# Patient Record
Sex: FEMALE | Race: WHITE | NOT HISPANIC OR LATINO | Employment: FULL TIME | URBAN - METROPOLITAN AREA
[De-identification: names, ages, dates, MRNs, and addresses within clinical notes are randomized per-mention and may not be internally consistent; named-entity substitution may affect disease eponyms.]

---

## 2017-01-12 ENCOUNTER — TRANSFERRED RECORDS (OUTPATIENT)
Dept: SURGERY | Facility: CLINIC | Age: 52
End: 2017-01-12

## 2017-02-02 ENCOUNTER — TRANSFERRED RECORDS (OUTPATIENT)
Dept: SURGERY | Facility: CLINIC | Age: 52
End: 2017-02-02

## 2017-02-03 ENCOUNTER — OFFICE VISIT (OUTPATIENT)
Dept: OTHER | Facility: CLINIC | Age: 52
End: 2017-02-03
Attending: INTERNAL MEDICINE
Payer: COMMERCIAL

## 2017-02-03 VITALS
HEART RATE: 102 BPM | WEIGHT: 127 LBS | SYSTOLIC BLOOD PRESSURE: 118 MMHG | RESPIRATION RATE: 12 BRPM | BODY MASS INDEX: 22.5 KG/M2 | DIASTOLIC BLOOD PRESSURE: 72 MMHG | OXYGEN SATURATION: 100 %

## 2017-02-03 DIAGNOSIS — E78.5 HYPERLIPIDEMIA LDL GOAL <100: ICD-10-CM

## 2017-02-03 DIAGNOSIS — I77.3 FIBROMUSCULAR DYSPLASIA (H): Primary | ICD-10-CM

## 2017-02-03 PROCEDURE — 99215 OFFICE O/P EST HI 40 MIN: CPT | Mod: ZP | Performed by: INTERNAL MEDICINE

## 2017-02-03 PROCEDURE — 99211 OFF/OP EST MAY X REQ PHY/QHP: CPT

## 2017-02-03 PROCEDURE — 40000809 ZZH STATISTIC NO DOCUMENTATION TO SUPPORT CHARGE

## 2017-02-03 NOTE — PROGRESS NOTES
See letter to PCP.   Greater than one half the 60 minutes total spent on the pt's visit were spent providing education and counselling to the patient regarding the above matters. Additional time secondary to young patient new to me with complex history.

## 2017-02-03 NOTE — PROGRESS NOTES
February 3, 2017            Phong Jarvis MD   [facility]   [address] [place]   [address]      Dear Juan:      I saw your patient, Razia Almeida in initial vascular medical at the request of the patient and her sister, who is a nurse at [employer] after she was previously seen by my partner, Dr. Rainer Onofre, to address management needs for her carotid and renal FMD with asymptomatic incidentally discovered carotid dissection on the right and beading without dissection on the left.  She has no intracranial aneurysms.  She has right renal artery fibromuscular dysplasia without mesenteric aneurysms.  She has no difficulty controlling her blood pressure.  She does utilize phentermine to help with concentration.  She also utilizes Estrace for hormone replacement therapy.  She has previously undergone chiropractic manipulations.  She does not engage in neck hyperextension maneuvers.  She has no joint laxity or loose skin (other than that caused by weight loss which was intentional).  She is currently undergoing adjuvant treatment with ipilimumab for stage IIIB malignant melanoma.  Her FMD findings were incidentally discovered as a result of imaging done in staging of her melanoma.  She has no clinical history of stroke or TIA.  She was previously recommended to start Plavix by my partner.  She is having significant bruising with this.        REVIEW OF SYSTEMS:  She is anxious regarding multiple doctor visits.  She denies chest pain, shortness of breath, nausea, vomiting, diarrhea, headache or visual changes.      PAST MEDICAL HISTORY:   1.  Anxiety.   2.  Stage IIIB melanoma.   3.  Chronic neck pain which is musculoskeletal in origin, not associated with carotid dissection pain.      PREVIOUS SURGICAL HISTORY:   1.  Knee arthroscopy.   2.  Hysterectomy.   3.  Lymphadenectomy.   4.  Breast lumpectomy.      FAMILY HISTORY:  Notable for paternal grandfather with heart  disease, 2 sisters with breast cancer, paternal grandmother with hypertension and a stroke.      SOCIAL HISTORY:  Notable for the fact that she is a former tobacco smoker.  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  She is .  She has 2 adult children.      CURRENT MEDICATIONS:     1.  ipilimumab per Oncology.   2.  Lipitor 10 mg daily.   3.  Norco p.r.n.   4.  Caltrate 600 mg daily.   5.  Clonazepam 0.5 mg t.i.d. p.r.n.   6.  Acyclovir 800 mg p.o. 5 times daily.   7.  Doxazosin 2 mg daily.   8.  Estrace 1 mg daily.   9.  Glucosamine 2 capsules daily.   10.  Nicoderm CQ.   11.  Phentermine 30 mg q.a.m. p.r.n.   12.  Imitrex 100 mg p.o. daily p.r.n.   13.  Ambien 5 mg p.o. each day at bedtime p.r.n.      ALLERGIES:  No known drug allergies.      PHYSICAL EXAMINATION:   VITAL SIGNS:  Blood pressure is 118/72, pulse is 102 and regular, respirations are 12, weight is 127 pounds.   HEENT:  Oropharynx within normal limits.   NECK:  No JVD, thyromegaly, lymphadenopathy, no carotid bruits.   LUNGS:  Clear to auscultation bilaterally without rales, wheezes or rhonchi.   HEART:  Regular rate and rhythm, normal S1, S2, no S3, S4, murmur, gallop or rub.   GASTROINTESTINAL:  Normoactive bowel sounds, soft, nondistended, nontender.   EXTREMITIES:  Without cyanosis, clubbing or edema.  No abdominal bruits.   NEUROLOGIC:  Nonfocal.   DERMATOLOGIC:  No obvious malignant nevi.      IMPRESSION:      Asymptomatic incidentally discovered fibromuscular dysplasia without poorly controlled hypertension involving the right renal artery, left internal carotid artery and right internal carotid artery with prior dissection asymptomatically occurring in the right internal carotid artery.      DISCUSSION:      Firstly, It was a pleasure to see patient.  Given her lack of clinical events, I do not feel strongly that she needs to remain on Plavix, particularly in light of her bruising.  She should, however, be on antiplatelet  therapy in the form of a baby aspirin.  She is advised to avoid neck extension maneuvers.  She is advised to avoid chiropractic manipulation.  I have advised her to stop Estrace and phentermine.  This is a difficult decision for her.  I informed her that it would be better for her from a vascular perspective if she was not utilizing hormone replacement therapy.  She understands the above.  She states that her hot flashes are so incapacitating that she will have to weigh the risks and benefits of continuation versus discontinuation in her mind.  She stated that she will stop phentermine.  She must not smoke moving forward.  She should be on a statin as a patient with fibromuscular dysplasia simply to prevent the progressive development of atherosclerotic disease.  I will obtain sonographic imaging of the patient's carotid and renal territories in November of this year.  I may not need to see her again in the absence of symptoms moving forward.  If she would have a vascular event then she would clearly be a candidate for Plavix and/or anticoagulation based upon the clinical scenario.  Alternatively, I informed her that if her blood pressure is poorly controlled on a good 3-drug regimen which includes a diuretic that we could consider renal artery angiography and angioplasty at that time.        Please do not hesitate to contact me if I may be of assistance regarding this or other matters moving forward into the future.       Sincerely,         ISABEL BILLY MD             D: 2017 09:43   T: 2017 11:03   MT: [id number]      Name:     DALE NORWOOD   MRN:      [id number]        Account:      [id number]   :      1965           Visit Date:   2017      Document: [id number]       cc: Phong Jarvis MD

## 2017-03-02 ENCOUNTER — HOSPITAL ENCOUNTER (EMERGENCY)
Facility: CLINIC | Age: 52
Discharge: HOME | End: 2017-03-02
Attending: EMERGENCY MEDICINE | Admitting: EMERGENCY MEDICINE
Payer: COMMERCIAL

## 2017-03-02 ENCOUNTER — APPOINTMENT (OUTPATIENT)
Dept: CT IMAGING | Facility: CLINIC | Age: 52
End: 2017-03-02
Attending: EMERGENCY MEDICINE
Payer: COMMERCIAL

## 2017-03-02 VITALS
RESPIRATION RATE: 18 BRPM | WEIGHT: 126 LBS | SYSTOLIC BLOOD PRESSURE: 126 MMHG | TEMPERATURE: 98.4 F | OXYGEN SATURATION: 98 % | HEART RATE: 125 BPM | DIASTOLIC BLOOD PRESSURE: 81 MMHG | BODY MASS INDEX: 21.51 KG/M2 | HEIGHT: 64 IN

## 2017-03-02 DIAGNOSIS — R10.84 ABDOMINAL PAIN, GENERALIZED: ICD-10-CM

## 2017-03-02 LAB
ALBUMIN UR-MCNC: NEGATIVE MG/DL
ANION GAP SERPL CALCULATED.3IONS-SCNC: 7 MMOL/L (ref 3–14)
APPEARANCE UR: ABNORMAL
BASE EXCESS BLDV CALC-SCNC: 4.7 MMOL/L
BASOPHILS # BLD AUTO: 0 10E9/L (ref 0–0.2)
BASOPHILS NFR BLD AUTO: 0.2 %
BILIRUB UR QL STRIP: NEGATIVE
BUN SERPL-MCNC: 14 MG/DL (ref 7–30)
CALCIUM SERPL-MCNC: 8.3 MG/DL (ref 8.5–10.1)
CHLORIDE SERPL-SCNC: 104 MMOL/L (ref 94–109)
CO2 SERPL-SCNC: 28 MMOL/L (ref 20–32)
COLOR UR AUTO: YELLOW
CREAT SERPL-MCNC: 0.77 MG/DL (ref 0.52–1.04)
DIFFERENTIAL METHOD BLD: ABNORMAL
EOSINOPHIL # BLD AUTO: 0.1 10E9/L (ref 0–0.7)
EOSINOPHIL NFR BLD AUTO: 0.3 %
ERYTHROCYTE [DISTWIDTH] IN BLOOD BY AUTOMATED COUNT: 12.6 % (ref 10–15)
GFR SERPL CREATININE-BSD FRML MDRD: 79 ML/MIN/1.7M2
GLUCOSE SERPL-MCNC: 137 MG/DL (ref 70–99)
GLUCOSE UR STRIP-MCNC: NEGATIVE MG/DL
HCO3 BLDV-SCNC: 29 MMOL/L (ref 21–28)
HCT VFR BLD AUTO: 42.5 % (ref 35–47)
HGB BLD-MCNC: 14 G/DL (ref 11.7–15.7)
HGB UR QL STRIP: NEGATIVE
HYALINE CASTS #/AREA URNS LPF: 1 /LPF (ref 0–2)
IMM GRANULOCYTES # BLD: 0.1 10E9/L (ref 0–0.4)
IMM GRANULOCYTES NFR BLD: 0.5 %
KETONES UR STRIP-MCNC: 5 MG/DL
LACTATE BLD-SCNC: 1 MMOL/L (ref 0.7–2.1)
LEUKOCYTE ESTERASE UR QL STRIP: NEGATIVE
LYMPHOCYTES # BLD AUTO: 1 10E9/L (ref 0.8–5.3)
LYMPHOCYTES NFR BLD AUTO: 6 %
MCH RBC QN AUTO: 28.3 PG (ref 26.5–33)
MCHC RBC AUTO-ENTMCNC: 32.9 G/DL (ref 31.5–36.5)
MCV RBC AUTO: 86 FL (ref 78–100)
MONOCYTES # BLD AUTO: 1.1 10E9/L (ref 0–1.3)
MONOCYTES NFR BLD AUTO: 6.3 %
MUCOUS THREADS #/AREA URNS LPF: PRESENT /LPF
NEUTROPHILS # BLD AUTO: 14.9 10E9/L (ref 1.6–8.3)
NEUTROPHILS NFR BLD AUTO: 86.7 %
NITRATE UR QL: NEGATIVE
NRBC # BLD AUTO: 0 10*3/UL
NRBC BLD AUTO-RTO: 0 /100
O2/TOTAL GAS SETTING VFR VENT: ABNORMAL %
OXYHGB MFR BLDV: 56 %
PCO2 BLDV: 44 MM HG (ref 40–50)
PH BLDV: 7.44 PH (ref 7.32–7.43)
PH UR STRIP: 6 PH (ref 5–7)
PLATELET # BLD AUTO: 290 10E9/L (ref 150–450)
PO2 BLDV: 30 MM HG (ref 25–47)
POTASSIUM SERPL-SCNC: 3.3 MMOL/L (ref 3.4–5.3)
RBC # BLD AUTO: 4.94 10E12/L (ref 3.8–5.2)
RBC #/AREA URNS AUTO: 1 /HPF (ref 0–2)
SODIUM SERPL-SCNC: 139 MMOL/L (ref 133–144)
SP GR UR STRIP: 1.02 (ref 1–1.03)
SQUAMOUS #/AREA URNS AUTO: <1 /HPF (ref 0–1)
URN SPEC COLLECT METH UR: ABNORMAL
UROBILINOGEN UR STRIP-MCNC: 0 MG/DL (ref 0–2)
WBC # BLD AUTO: 17.2 10E9/L (ref 4–11)
WBC #/AREA URNS AUTO: <1 /HPF (ref 0–2)

## 2017-03-02 PROCEDURE — 74177 CT ABD & PELVIS W/CONTRAST: CPT

## 2017-03-02 PROCEDURE — 96374 THER/PROPH/DIAG INJ IV PUSH: CPT | Mod: 59

## 2017-03-02 PROCEDURE — 96361 HYDRATE IV INFUSION ADD-ON: CPT

## 2017-03-02 PROCEDURE — 82805 BLOOD GASES W/O2 SATURATION: CPT | Performed by: EMERGENCY MEDICINE

## 2017-03-02 PROCEDURE — 25000128 H RX IP 250 OP 636: Performed by: EMERGENCY MEDICINE

## 2017-03-02 PROCEDURE — 83605 ASSAY OF LACTIC ACID: CPT | Performed by: EMERGENCY MEDICINE

## 2017-03-02 PROCEDURE — 81001 URINALYSIS AUTO W/SCOPE: CPT | Performed by: EMERGENCY MEDICINE

## 2017-03-02 PROCEDURE — 99285 EMERGENCY DEPT VISIT HI MDM: CPT | Mod: 25

## 2017-03-02 PROCEDURE — 80048 BASIC METABOLIC PNL TOTAL CA: CPT | Performed by: EMERGENCY MEDICINE

## 2017-03-02 PROCEDURE — 85025 COMPLETE CBC W/AUTO DIFF WBC: CPT | Performed by: EMERGENCY MEDICINE

## 2017-03-02 PROCEDURE — 25800025 ZZH RX 258: Performed by: EMERGENCY MEDICINE

## 2017-03-02 PROCEDURE — 25500064 ZZH RX 255 OP 636: Performed by: EMERGENCY MEDICINE

## 2017-03-02 RX ORDER — HYDROCODONE BITARTRATE AND ACETAMINOPHEN 5; 325 MG/1; MG/1
1-2 TABLET ORAL EVERY 6 HOURS PRN
Qty: 12 TABLET | Refills: 0 | Status: SHIPPED | OUTPATIENT
Start: 2017-03-02

## 2017-03-02 RX ORDER — IOPAMIDOL 755 MG/ML
500 INJECTION, SOLUTION INTRAVASCULAR ONCE
Status: COMPLETED | OUTPATIENT
Start: 2017-03-02 | End: 2017-03-02

## 2017-03-02 RX ADMIN — SODIUM CHLORIDE, POTASSIUM CHLORIDE, SODIUM LACTATE AND CALCIUM CHLORIDE 1000 ML: 600; 310; 30; 20 INJECTION, SOLUTION INTRAVENOUS at 20:24

## 2017-03-02 RX ADMIN — HYDROMORPHONE HYDROCHLORIDE 1 MG: 1 INJECTION, SOLUTION INTRAMUSCULAR; INTRAVENOUS; SUBCUTANEOUS at 20:24

## 2017-03-02 RX ADMIN — IOPAMIDOL 63 ML: 755 INJECTION, SOLUTION INTRAVENOUS at 20:34

## 2017-03-02 RX ADMIN — SODIUM CHLORIDE 56 ML: 9 INJECTION, SOLUTION INTRAVENOUS at 20:34

## 2017-03-02 ASSESSMENT — ENCOUNTER SYMPTOMS
BLOOD IN STOOL: 1
ABDOMINAL PAIN: 1

## 2017-03-02 NOTE — ED AVS SNAPSHOT
[facility] Emergency Department    [address]    [facility] [address]    Phone:  [phone]    Fax:  [phone]                                       Razia Almeida   MRN: [id number]    Department:  [facility] Emergency Department   Date of Visit:  3/2/2017           After Visit Summary Signature Page     I have received my discharge instructions, and my questions have been answered. I have discussed any challenges I see with this plan with the nurse or doctor.    ..........................................................................................................................................  Patient/Patient Representative Signature      ..........................................................................................................................................  Patient Representative Print Name and Relationship to Patient    ..................................................               ................................................  Date                                            Time    ..........................................................................................................................................  Reviewed by Signature/Title    ...................................................              ..............................................  Date                                                            Time

## 2017-03-02 NOTE — ED AVS SNAPSHOT
[facility] Emergency Department    [address]    [facility] [address]    Phone:  [phone]    Fax:  [phone]                                       Razia Almeida   MRN: [id number]    Department:  [facility] Emergency Department   Date of Visit:  3/2/2017           Patient Information     Date Of Birth          1965        Your diagnoses for this visit were:     Abdominal pain, generalized        You were seen by Adriano Hernandez MD.      Follow-up Information     Follow up with Phong Jarvis MD. Schedule an appointment as soon as possible for a visit in 5 days.    Specialty:  Family Practice    Why:  As needed    Contact information:    [facility]  [address] [facility] [address] [phone]          Discharge Instructions       Discharge Instructions  Abdominal Pain    Abdominal pain can be caused by many things. Your evaluation today does not show the exact cause for your pain. Your doctor today has decided that it is unlikely your pain is due to a life threatening problem, or a problem requiring surgery or hospital admission. Sometimes those problems cannot be found right away, so it is very important that you follow up as directed.  Sometimes only the changes which occur over time allow the cause of your pain to be found.    Return to the Emergency Department for a recheck in 8-12 hours if your pain continues.  If your pain gets worse, changes in location, or feels different, return to the Emergency Department right away.    ADULTS:  Return to the Emergency Department right away if:      You get an oral temperature above 102oF or as directed by your doctor.    You have blood in your stools (bright red or black, tarry stools).    You keep throwing up or can t drink liquids.    You see blood when you throw up.    You can t have a bowel movement or you can t pass gas.    Your stomach gets bloated or bigger.    Your skin  or the whites of your eyes look yellow.    You faint.    You have bloody, frequent or painful urination.    You have new symptoms or anything that worries you.    CHILDREN:  Return to the Emergency Department right away if your child has any of the above-listed symptoms or the following:      Pushes your hand away or screams/cries when his/her belly is touched.    You notice your child is very fussy or weak.    Your child is very tired and is too tired to eat or drink.    Your child is dehydrated.  Signs of dehydration can be:  o Your infant has had no wet diapers in 4-5 hours.  o Your older child has not passed urine in 6-8 hours.  o Your infant or child starts to have dry mouth and lips, or no saliva or tears.    PREGNANT WOMEN:  Return to the Emergency Department right away if you have any of the above-listed symptoms or the following:      You have bleeding, leaking fluid or passing tissue from the vagina.    You have worse pain or cramping, or pain in your shoulder or back.    You have vomiting that will not stop.    You have painful or bloody urination.    You have a temperature of 100oF or more.    Your baby is not moving as much as usual.    You faint.    You get a bad headache with or without eye problems and abdominal pain.    You have a convulsion or seizure.    You have unusual discharge from your vagina and abdominal pain.    Abdominal pain is pretty common during pregnancy.  Your pain may or may not be related to your pregnancy. You should follow-up closely with your OB doctor so they can evaluate you and your baby.  Until you follow-up with your regular doctor, do the following:       Avoid sex and do not put anything in your vagina.    Drink clear fluids.    Only take medications approved by your doctor.    MORE INFORMATION:    Appendicitis:  A possible cause of abdominal pain in any person who still has their appendix is acute appendicitis. Appendicitis is often hard to diagnose.  Testing does not  "always rule out early appendicitis or other causes of abdominal pain. Close follow-up with your doctor and re-evaluations may be needed to figure out the reason for your abdominal pain.    Follow-up:  It is very important that you make an appointment with your clinic and go to the appointment.  If you do not follow-up with your primary doctor, it may result in missing an important development which could result in permanent injury or disability and/or lasting pain.  If there is any problem keeping your appointment, call your doctor or return to the Emergency Department.    Medications:  Take your medications as directed by your doctor today.  Before using over-the-counter medications, ask your doctor and make sure to take the medications as directed.  If you have any questions about medications, ask your doctor.    Diet:  Resume your normal diet as much as possible, but do not eat fried, fatty or spicy foods while you have pain.  Do not drink alcohol or have caffeine.  Do not smoke tobacco.    Probiotics: If you have been given an antibiotic, you may want to also take a probiotic pill or eat yogurt with live cultures. Probiotics have \"good bacteria\" to help your intestines stay healthy. Studies have shown that probiotics help prevent diarrhea and other intestine problems (including C. diff infection) when you take antibiotics. You can buy these without a prescription in the pharmacy section of the store.     If you were given a prescription for medicine here today, be sure to read all of the information (including the package insert) that comes with your prescription.  This will include important information about the medicine, its side effects, and any warnings that you need to know about.  The pharmacist who fills the prescription can provide more information and answer questions you may have about the medicine.  If you have questions or concerns that the pharmacist cannot address, please call or return to the " Emergency Department.         Opioid Medication Information    Pain medications are among the most commonly prescribed medicines, so we are including this information for all our patients. If you did not receive pain medication or get a prescription for pain medicine, you can ignore it.     You may have been given a prescription for an opioid (narcotic) pain medicine and/or have received a pain medicine while here in the Emergency Department. These medicines can make you drowsy or impaired. You must not drive, operate dangerous equipment, or engage in any other dangerous activities while taking these medications. If you drive while taking these medications, you could be arrested for DUI, or driving under the influence. Do not drink any alcohol while you are taking these medications.     Opioid pain medications can cause addiction. If you have a history of chemical dependency of any type, you are at a higher risk of becoming addicted to pain medications.  Only take these prescribed medications to treat your pain when all other options have been tried. Take it for as short a time and as few doses as possible. Store your pain pills in a secure place, as they are frequently stolen and provide a dangerous opportunity for children or visitors in your house to start abusing these powerful medications. We will not replace any lost or stolen medicine.  As soon as your pain is better, you should flush all your remaining medication.     Many prescription pain medications contain Tylenol  (acetaminophen), including Vicodin , Tylenol #3 , Norco , Lortab , and Percocet .  You should not take any extra pills of Tylenol  if you are using these prescription medications or you can get very sick.  Do not ever take more than 3000 mg of acetaminophen in any 24 hour period.    All opioids tend to cause constipation. Drink plenty of water and eat foods that have a lot of fiber, such as fruits, vegetables, prune juice, apple juice and high  fiber cereal.  Take a laxative if you don t move your bowels at least every other day. Miralax , Milk of Magnesia, Colace , or Senna  can be used to keep you regular.      Remember that you can always come back to the Emergency Department if you are not able to see your regular doctor in the amount of time listed above, if you get any new symptoms, or if there is anything that worries you.          24 Hour Appointment Hotline       To make an appointment at any [facility], call [phone] ([phone]). If you don't have a family doctor or clinic, we will help you find one. [place] clinics are conveniently located to serve the needs of you and your family.             Review of your medicines      CONTINUE these medicines which may have CHANGED, or have new prescriptions. If we are uncertain of the size of tablets/capsules you have at home, strength may be listed as something that might have changed.        Dose / Directions Last dose taken    * HYDROcodone-acetaminophen 5-325 MG per tablet   Commonly known as:  NORCO   Dose:  1-2 tablet   What changed:  Another medication with the same name was added. Make sure you understand how and when to take each.   Quantity:  30 tablet        Take 1-2 tablets by mouth every 4 hours as needed for other (Moderate to Severe Pain)   Refills:  0        * HYDROcodone-acetaminophen 5-325 MG per tablet   Commonly known as:  NORCO   Dose:  1-2 tablet   What changed:  You were already taking a medication with the same name, and this prescription was added. Make sure you understand how and when to take each.   Quantity:  12 tablet        Take 1-2 tablets by mouth every 6 hours as needed for moderate to severe pain   Refills:  0        * Notice:  This list has 2 medication(s) that are the same as other medications prescribed for you. Read the directions carefully, and ask your doctor or other care provider to review them with you.      Our records show that you are taking  the medicines listed below. If these are incorrect, please call your family doctor or clinic.        Dose / Directions Last dose taken    ACYCLOVIR PO   Dose:  800 mg        Take 800 mg by mouth 5 times daily   Refills:  0        AMBIEN PO   Dose:  5 mg        Take 5 mg by mouth nightly as needed   Refills:  0        aspirin 81 MG EC tablet   Dose:  81 mg   Quantity:  90 tablet        Take 1 tablet (81 mg) by mouth daily   Refills:  3        ATIVAN PO   Dose:  0.5 mg        Take 0.5 mg by mouth once   Refills:  0        atorvastatin 10 MG tablet   Commonly known as:  LIPITOR   Dose:  10 mg   Quantity:  90 tablet        Take 1 tablet (10 mg) by mouth daily   Refills:  3        calcium-vitamin D 600-400 MG-UNIT per tablet   Commonly known as:  CALTRATE   Dose:  1 tablet        Take 1 tablet by mouth daily   Refills:  0        DOXAZOSIN MESYLATE PO   Dose:  2 mg        Take 2 mg by mouth daily   Refills:  0        ESTRACE PO   Dose:  1 mg        Take 1 mg by mouth daily   Refills:  0        glucosamine-chondroitin 500-400 MG Caps per capsule   Dose:  2 capsule        Take 2 capsules by mouth daily   Refills:  0        hydrocortisone 2.5 % cream   Commonly known as:  ANUSOL-HC        Place rectally 2 times daily   Refills:  0        IMITREX PO   Dose:  100 mg        Take 100 mg by mouth once   Refills:  0        nicotine 21 MG/24HR 24 hr patch   Commonly known as:  NICODERM CQ   Dose:  1 patch        Place 1 patch onto the skin every 24 hours   Refills:  0        YERVOY IV        Refills:  0                Prescriptions were sent or printed at these locations (1 Prescription)                   Other Prescriptions                Printed at Department/Unit printer (1 of 1)         HYDROcodone-acetaminophen (NORCO) 5-325 MG per tablet                Procedures and tests performed during your visit     Basic metabolic panel (BMP)    Blood gas venous and oxyhgb    CBC + differential    CT Abdomen Pelvis w Contrast    Lactic  acid whole blood    Peripheral IV catheter    UA with Microscopic reflex to Culture      Orders Needing Specimen Collection     None      Pending Results     No orders found from 2/28/2017 to 3/3/2017.            Pending Culture Results     No orders found from 2/28/2017 to 3/3/2017.             Test Results from your hospital stay     3/2/2017  8:28 PM - Interface, Flexilab Results      Component Results     Component Value Ref Range & Units Status    WBC 17.2 (H) 4.0 - 11.0 10e9/L Final    RBC Count 4.94 3.8 - 5.2 10e12/L Final    Hemoglobin 14.0 11.7 - 15.7 g/dL Final    Hematocrit 42.5 35.0 - 47.0 % Final    MCV 86 78 - 100 fl Final    MCH 28.3 26.5 - 33.0 pg Final    MCHC 32.9 31.5 - 36.5 g/dL Final    RDW 12.6 10.0 - 15.0 % Final    Platelet Count 290 150 - 450 10e9/L Final    Diff Method Automated Method  Final    % Neutrophils 86.7 % Final    % Lymphocytes 6.0 % Final    % Monocytes 6.3 % Final    % Eosinophils 0.3 % Final    % Basophils 0.2 % Final    % Immature Granulocytes 0.5 % Final    Nucleated RBCs 0 0 /100 Final    Absolute Neutrophil 14.9 (H) 1.6 - 8.3 10e9/L Final    Absolute Lymphocytes 1.0 0.8 - 5.3 10e9/L Final    Absolute Monocytes 1.1 0.0 - 1.3 10e9/L Final    Absolute Eosinophils 0.1 0.0 - 0.7 10e9/L Final    Absolute Basophils 0.0 0.0 - 0.2 10e9/L Final    Abs Immature Granulocytes 0.1 0 - 0.4 10e9/L Final    Absolute Nucleated RBC 0.0  Final         3/2/2017  8:42 PM - Interface, Flexilab Results      Component Results     Component Value Ref Range & Units Status    Sodium 139 133 - 144 mmol/L Final    Potassium 3.3 (L) 3.4 - 5.3 mmol/L Final    Chloride 104 94 - 109 mmol/L Final    Carbon Dioxide 28 20 - 32 mmol/L Final    Anion Gap 7 3 - 14 mmol/L Final    Glucose 137 (H) 70 - 99 mg/dL Final    Urea Nitrogen 14 7 - 30 mg/dL Final    Creatinine 0.77 0.52 - 1.04 mg/dL Final    GFR Estimate 79 >60 mL/min/1.7m2 Final    Non  GFR Calc    GFR Estimate If Black >90    American GFR Calc   >60 mL/min/1.7m2 Final    Calcium 8.3 (L) 8.5 - 10.1 mg/dL Final         3/2/2017  8:28 PM - Interface, Flexilab Results      Component Results     Component Value Ref Range & Units Status    Ph Venous 7.44 (H) 7.32 - 7.43 pH Final    PCO2 Venous 44 40 - 50 mm Hg Final    PO2 Venous 30 25 - 47 mm Hg Final    Bicarbonate Venous 29 (H) 21 - 28 mmol/L Final    FIO2 Room Air  Final    Oxyhemoglobin Venous 56 % Final    Base Excess Venous 4.7 mmol/L Final    Abnormal Result, Ref range: -7.7 to 1.9         3/2/2017  8:28 PM - Interface, Flexilab Results      Component Results     Component Value Ref Range & Units Status    Lactic Acid 1.0 0.7 - 2.1 mmol/L Final         3/2/2017  9:22 PM - Interface, Radiant Ib      Narrative     CT ABDOMEN AND PELVIS WITH CONTRAST   3/2/2017 8:42 PM     HISTORY: Severe diffuse abdominal pain. Status post colonoscopy.  Evaluate for perforation. History of melanoma.    TECHNIQUE: 63mL Isovue-370 IV were administered. After contrast  administration, volumetric helical sections were acquired from the  lung bases to the ischial tuberosities. Coronal images were also  reconstructed. Radiation dose for this scan was reduced using  automated exposure control, adjustment of the mA and/or kV according  to patient size, or iterative reconstruction technique.    COMPARISON: CT of the chest, abdomen, and pelvis performed 12/28/2016.      FINDINGS: No free intraperitoneal air is identified. No  intra-abdominal fluid collections to suggest abscess. There are a few  mildly dilated fluid-filled loops of small bowel in the pelvis, with  no definite transition point identified. No convincing evidence for  colitis or diverticulitis. No free fluid in the pelvis. The appendix  is not visualized. A tiny low-density lesion in the posterior segment  of the right hepatic lobe posteriorly measures 0.4 cm and is too small  to characterize, but appears unchanged. The liver, gallbladder,  spleen,  adrenal glands, pancreas, and kidneys are otherwise  unremarkable. No hydronephrosis. No enlarged lymph nodes are  identified in the abdomen or pelvis.        Impression     IMPRESSION:   1. Few mildly dilated fluid-filled loops of small bowel in the lower  abdomen, with no definite transition point identified. Findings  suggest ileus. A developing small bowel obstruction is considered less  likely, however, clinical correlation and follow-up are recommended.  2. No evidence for bowel perforation.    KARUNA WINTERS MD         3/2/2017  8:47 PM - Interface, Flexilab Results      Component Results     Component Value Ref Range & Units Status    Color Urine Yellow  Final    Appearance Urine Slightly Cloudy  Final    Glucose Urine Negative NEG mg/dL Final    Bilirubin Urine Negative NEG Final    Ketones Urine 5 (A) NEG mg/dL Final    Specific Gravity Urine 1.018 1.003 - 1.035 Final    Blood Urine Negative NEG Final    pH Urine 6.0 5.0 - 7.0 pH Final    Protein Albumin Urine Negative NEG mg/dL Final    Urobilinogen mg/dL 0.0 0.0 - 2.0 mg/dL Final    Nitrite Urine Negative NEG Final    Leukocyte Esterase Urine Negative NEG Final    Source Midstream Urine  Final    WBC Urine <1 0 - 2 /HPF Final    RBC Urine 1 0 - 2 /HPF Final    Squamous Epithelial /HPF Urine <1 0 - 1 /HPF Final    Mucous Urine Present (A) NEG /LPF Final    Hyaline Casts 1 0 - 2 /LPF Final                Clinical Quality Measure: Blood Pressure Screening     Your blood pressure was checked while you were in the emergency department today. The last reading we obtained was  BP: 126/81 . Please read the guidelines below about what these numbers mean and what you should do about them.  If your systolic blood pressure (the top number) is less than 120 and your diastolic blood pressure (the bottom number) is less than 80, then your blood pressure is normal. There is nothing more that you need to do about it.  If your systolic blood pressure (the top number) is  "120-139 or your diastolic blood pressure (the bottom number) is 80-89, your blood pressure may be higher than it should be. You should have your blood pressure rechecked within a year by a primary care provider.  If your systolic blood pressure (the top number) is 140 or greater or your diastolic blood pressure (the bottom number) is 90 or greater, you may have high blood pressure. High blood pressure is treatable, but if left untreated over time it can put you at risk for heart attack, stroke, or kidney failure. You should have your blood pressure rechecked by a primary care provider within the next 4 weeks.  If your provider in the emergency department today gave you specific instructions to follow-up with your doctor or provider even sooner than that, you should follow that instruction and not wait for up to 4 weeks for your follow-up visit.        Thank you for choosing [facility]       Thank you for choosing [facility] for your care. Our goal is always to provide you with excellent care. Hearing back from our patients is one way we can continue to improve our services. Please take a few minutes to complete the written survey that you may receive in the mail after you visit with us. Thank you!        [employer] Information     [employer] lets you send messages to your doctor, view your test results, renew your prescriptions, schedule appointments and more. To sign up, go to [email or web address] . Click on \"Log in\" on the left side of the screen, which will take you to the Welcome page. Then click on \"Sign up Now\" on the right side of the page.     You will be asked to enter the access code listed below, as well as some personal information. Please follow the directions to create your username and password.     Your access code is: [email or web address]  Expires: 2017  9:28 PM     Your access code will  in 90 days. If you need help or a new code, please call your [facility] clinic or [phone].        Care EveryWhere " ID     This is your Care EveryWhere ID. This could be used by other organizations to access your [facility] medical records  [id number]        After Visit Summary       This is your record. Keep this with you and show to your community pharmacist(s) and doctor(s) at your next visit.

## 2017-03-03 ENCOUNTER — HOSPITAL ENCOUNTER (OUTPATIENT)
Facility: CLINIC | Age: 52
Setting detail: OBSERVATION
Discharge: HOME | End: 2017-03-04
Attending: EMERGENCY MEDICINE | Admitting: HOSPITALIST
Payer: COMMERCIAL

## 2017-03-03 DIAGNOSIS — K92.2 LOWER GI BLEED: ICD-10-CM

## 2017-03-03 LAB
ABO + RH BLD: NORMAL
ABO + RH BLD: NORMAL
ANION GAP SERPL CALCULATED.3IONS-SCNC: 6 MMOL/L (ref 3–14)
APTT PPP: 39 SEC (ref 22–37)
BASOPHILS # BLD AUTO: 0 10E9/L (ref 0–0.2)
BASOPHILS NFR BLD AUTO: 0.2 %
BLD GP AB SCN SERPL QL: NORMAL
BLOOD BANK CMNT PATIENT-IMP: NORMAL
BUN SERPL-MCNC: 15 MG/DL (ref 7–30)
CALCIUM SERPL-MCNC: 8 MG/DL (ref 8.5–10.1)
CHLORIDE SERPL-SCNC: 102 MMOL/L (ref 94–109)
CO2 SERPL-SCNC: 30 MMOL/L (ref 20–32)
CREAT SERPL-MCNC: 0.85 MG/DL (ref 0.52–1.04)
DIFFERENTIAL METHOD BLD: ABNORMAL
EOSINOPHIL # BLD AUTO: 0.3 10E9/L (ref 0–0.7)
EOSINOPHIL NFR BLD AUTO: 2.4 %
ERYTHROCYTE [DISTWIDTH] IN BLOOD BY AUTOMATED COUNT: 12.7 % (ref 10–15)
GFR SERPL CREATININE-BSD FRML MDRD: 71 ML/MIN/1.7M2
GLUCOSE SERPL-MCNC: 68 MG/DL (ref 70–99)
HCT VFR BLD AUTO: 31.7 % (ref 35–47)
HGB BLD-MCNC: 10.2 G/DL (ref 11.7–15.7)
HGB BLD-MCNC: 9.1 G/DL (ref 11.7–15.7)
IMM GRANULOCYTES # BLD: 0 10E9/L (ref 0–0.4)
IMM GRANULOCYTES NFR BLD: 0.3 %
INR PPP: 1.15 (ref 0.86–1.14)
LYMPHOCYTES # BLD AUTO: 2.6 10E9/L (ref 0.8–5.3)
LYMPHOCYTES NFR BLD AUTO: 23 %
MCH RBC QN AUTO: 28.3 PG (ref 26.5–33)
MCHC RBC AUTO-ENTMCNC: 32.2 G/DL (ref 31.5–36.5)
MCV RBC AUTO: 88 FL (ref 78–100)
MONOCYTES # BLD AUTO: 0.6 10E9/L (ref 0–1.3)
MONOCYTES NFR BLD AUTO: 4.9 %
NEUTROPHILS # BLD AUTO: 7.8 10E9/L (ref 1.6–8.3)
NEUTROPHILS NFR BLD AUTO: 69.2 %
NRBC # BLD AUTO: 0 10*3/UL
NRBC BLD AUTO-RTO: 0 /100
PLATELET # BLD AUTO: 286 10E9/L (ref 150–450)
POTASSIUM SERPL-SCNC: 3.7 MMOL/L (ref 3.4–5.3)
RBC # BLD AUTO: 3.6 10E12/L (ref 3.8–5.2)
SODIUM SERPL-SCNC: 138 MMOL/L (ref 133–144)
SPECIMEN EXP DATE BLD: NORMAL
WBC # BLD AUTO: 11.3 10E9/L (ref 4–11)

## 2017-03-03 PROCEDURE — 85025 COMPLETE CBC W/AUTO DIFF WBC: CPT | Performed by: EMERGENCY MEDICINE

## 2017-03-03 PROCEDURE — G0378 HOSPITAL OBSERVATION PER HR: HCPCS

## 2017-03-03 PROCEDURE — 99220 ZZC INITIAL OBSERVATION CARE,LEVL III: CPT | Performed by: PHYSICIAN ASSISTANT

## 2017-03-03 PROCEDURE — 25000128 H RX IP 250 OP 636: Performed by: EMERGENCY MEDICINE

## 2017-03-03 PROCEDURE — 86850 RBC ANTIBODY SCREEN: CPT | Performed by: EMERGENCY MEDICINE

## 2017-03-03 PROCEDURE — 86901 BLOOD TYPING SEROLOGIC RH(D): CPT | Performed by: EMERGENCY MEDICINE

## 2017-03-03 PROCEDURE — 96361 HYDRATE IV INFUSION ADD-ON: CPT

## 2017-03-03 PROCEDURE — 36415 COLL VENOUS BLD VENIPUNCTURE: CPT | Performed by: EMERGENCY MEDICINE

## 2017-03-03 PROCEDURE — 25000128 H RX IP 250 OP 636: Performed by: PHYSICIAN ASSISTANT

## 2017-03-03 PROCEDURE — 36415 COLL VENOUS BLD VENIPUNCTURE: CPT | Performed by: PHYSICIAN ASSISTANT

## 2017-03-03 PROCEDURE — 80048 BASIC METABOLIC PNL TOTAL CA: CPT | Performed by: EMERGENCY MEDICINE

## 2017-03-03 PROCEDURE — 96360 HYDRATION IV INFUSION INIT: CPT

## 2017-03-03 PROCEDURE — 99285 EMERGENCY DEPT VISIT HI MDM: CPT | Mod: 25

## 2017-03-03 PROCEDURE — 85018 HEMOGLOBIN: CPT | Mod: 91 | Performed by: PHYSICIAN ASSISTANT

## 2017-03-03 PROCEDURE — 25000125 ZZHC RX 250: Performed by: PHYSICIAN ASSISTANT

## 2017-03-03 PROCEDURE — 86900 BLOOD TYPING SEROLOGIC ABO: CPT | Performed by: EMERGENCY MEDICINE

## 2017-03-03 PROCEDURE — 96374 THER/PROPH/DIAG INJ IV PUSH: CPT

## 2017-03-03 PROCEDURE — 85730 THROMBOPLASTIN TIME PARTIAL: CPT | Performed by: EMERGENCY MEDICINE

## 2017-03-03 PROCEDURE — 85610 PROTHROMBIN TIME: CPT | Performed by: EMERGENCY MEDICINE

## 2017-03-03 RX ORDER — NALOXONE HYDROCHLORIDE 0.4 MG/ML
.1-.4 INJECTION, SOLUTION INTRAMUSCULAR; INTRAVENOUS; SUBCUTANEOUS
Status: DISCONTINUED | OUTPATIENT
Start: 2017-03-03 | End: 2017-03-04 | Stop reason: HOSPADM

## 2017-03-03 RX ORDER — SODIUM CHLORIDE 9 MG/ML
INJECTION, SOLUTION INTRAVENOUS CONTINUOUS
Status: DISCONTINUED | OUTPATIENT
Start: 2017-03-03 | End: 2017-03-04

## 2017-03-03 RX ORDER — ATORVASTATIN CALCIUM 10 MG/1
10 TABLET, FILM COATED ORAL EVERY EVENING
Status: DISCONTINUED | OUTPATIENT
Start: 2017-03-03 | End: 2017-03-04 | Stop reason: HOSPADM

## 2017-03-03 RX ORDER — ACETAMINOPHEN 325 MG/1
650 TABLET ORAL EVERY 4 HOURS PRN
Status: DISCONTINUED | OUTPATIENT
Start: 2017-03-03 | End: 2017-03-04 | Stop reason: HOSPADM

## 2017-03-03 RX ORDER — PHENTERMINE HYDROCHLORIDE 30 MG/1
30 CAPSULE ORAL DAILY PRN
COMMUNITY
End: 2017-07-18

## 2017-03-03 RX ORDER — ONDANSETRON 4 MG/1
4 TABLET, ORALLY DISINTEGRATING ORAL EVERY 6 HOURS PRN
Status: DISCONTINUED | OUTPATIENT
Start: 2017-03-03 | End: 2017-03-04 | Stop reason: HOSPADM

## 2017-03-03 RX ORDER — DOXAZOSIN 4 MG/1
4 TABLET ORAL EVERY MORNING
Status: DISCONTINUED | OUTPATIENT
Start: 2017-03-04 | End: 2017-03-04 | Stop reason: HOSPADM

## 2017-03-03 RX ORDER — ESTRADIOL 0.5 MG/1
1 TABLET ORAL EVERY EVENING
Status: DISCONTINUED | OUTPATIENT
Start: 2017-03-03 | End: 2017-03-04 | Stop reason: HOSPADM

## 2017-03-03 RX ORDER — ONDANSETRON 2 MG/ML
4 INJECTION INTRAMUSCULAR; INTRAVENOUS EVERY 6 HOURS PRN
Status: DISCONTINUED | OUTPATIENT
Start: 2017-03-03 | End: 2017-03-04 | Stop reason: HOSPADM

## 2017-03-03 RX ORDER — ZOLPIDEM TARTRATE 5 MG/1
5 TABLET ORAL AT BEDTIME
Status: DISCONTINUED | OUTPATIENT
Start: 2017-03-03 | End: 2017-03-04 | Stop reason: HOSPADM

## 2017-03-03 RX ORDER — LIDOCAINE 40 MG/G
CREAM TOPICAL
Status: DISCONTINUED | OUTPATIENT
Start: 2017-03-03 | End: 2017-03-04 | Stop reason: HOSPADM

## 2017-03-03 RX ORDER — OXYCODONE HYDROCHLORIDE 5 MG/1
5 TABLET ORAL
Status: DISCONTINUED | OUTPATIENT
Start: 2017-03-03 | End: 2017-03-04 | Stop reason: HOSPADM

## 2017-03-03 RX ORDER — HYDROCORTISONE ACETATE 0.5 %
3000 CREAM (GRAM) TOPICAL EVERY EVENING
COMMUNITY

## 2017-03-03 RX ADMIN — PANTOPRAZOLE SODIUM 40 MG: 40 INJECTION, POWDER, FOR SOLUTION INTRAVENOUS at 18:44

## 2017-03-03 RX ADMIN — SODIUM CHLORIDE 1000 ML: 9 INJECTION, SOLUTION INTRAVENOUS at 14:00

## 2017-03-03 RX ADMIN — SODIUM CHLORIDE 1000 ML: 9 INJECTION, SOLUTION INTRAVENOUS at 18:44

## 2017-03-03 RX ADMIN — SODIUM CHLORIDE: 9 INJECTION, SOLUTION INTRAVENOUS at 21:16

## 2017-03-03 ASSESSMENT — ENCOUNTER SYMPTOMS
BLOOD IN STOOL: 1
ABDOMINAL PAIN: 1
LIGHT-HEADEDNESS: 1

## 2017-03-03 NOTE — IP AVS SNAPSHOT
MRN:[id number]                      After Visit Summary   3/3/2017    Razia Almeida    MRN: [id number]           Thank you!     Thank you for choosing [facility] for your care. Our goal is always to provide you with excellent care. Hearing back from our patients is one way we can continue to improve our services. Please take a few minutes to complete the written survey that you may receive in the mail after you visit. If you would like to speak to someone directly about your visit please contact Patient Relations at [phone]. Thank you!          Patient Information     Date Of Birth          1965        About your hospital stay     You were admitted on:  March 3, 2017 You last received care in the:  [facility] Observation Department    You were discharged on:  March 4, 2017        Reason for your hospital stay       You were admitted for concerns of a gastrointestinal bleed after a recent colonoscopy and polyp resection. Your workup included a CT scan of your abdomen which showed concern for slowing of your bowel but no other findings. Serial hemoglobins were monitored overnight and remained stable. As well you were treated with IV fluids and your diet was slowly advanced without recurrence of bloody stools or abdominal pain. You are safe to discharge home.                  Who to Call     For medical emergencies, please call 911.  For non-urgent questions about your medical care, please call your primary care provider or clinic, [phone]          Attending Provider     Provider Specialty    Regino Palacios MD Emergency Medicine    Bruce CrossingKi MD Internal Medicine       Primary Care Provider Office Phone # Fax #    Phong Jarvis [phone]       [address] [address]        After Care Instructions     Activity       Your activity upon discharge: activity as tolerated       "      Diet       Follow this diet upon discharge: Orders Placed This Encounter      Advance Diet as Tolerated: Regular Diet Adult            Discharge Instructions       Continue to hold daily aspirin as instructed for procedure and resume as instructed by GI.                  Follow-up Appointments     Follow-up and recommended labs and tests        Follow up with primary care provider, Phong Jarvis, within 7 days for hospital follow- up.  No follow up labs or test are needed.  Follow up with [facility] per their recommendations, contact them on Monday to discuss if outpatient follow up is needed.                             Pending Results     No orders found for last 3 day(s).            Statement of Approval     Ordered          03/04/17 1031  I have reviewed and agree with all the recommendations and orders detailed in this document.  EFFECTIVE NOW     Approved and electronically signed by:  Odalis Sharma PA-C             Admission Information     Date & Time Provider Department Dept. Phone    3/3/2017 Ki Heaton MD [facility] Observation Department [phone]      Your Vitals Were     Blood Pressure Pulse Temperature Respirations Height Weight    115/68 90 96.7  F (35.9  C) (Oral) 14 1.626 m (5' 4\") 57.2 kg (126 lb)    Pulse Oximetry BMI (Body Mass Index)                100% 21.63 kg/m2          MyChart Information     [facility] lets you send messages to your doctor, view your test results, renew your prescriptions, schedule appointments and more. To sign up, go to [email or web address] . Click on \"Log in\" on the left side of the screen, which will take you to the Welcome page. Then click on \"Sign up Now\" on the right side of the page.     You will be asked to enter the access code listed below, as well as some personal information. Please follow the directions to create your username and password.     Your access code is: [email or web address]  Expires: 5/31/2017  9:28 PM     Your " access code will  in 90 days. If you need help or a new code, please call your [facility] clinic or [phone].        Care EveryWhere ID     This is your Care EveryWhere ID. This could be used by other organizations to access your [facility] medical records  [id number]           Review of your medicines      CONTINUE these medicines which may have CHANGED, or have new prescriptions. If we are uncertain of the size of tablets/capsules you have at home, strength may be listed as something that might have changed.        Dose / Directions    atorvastatin 10 MG tablet   Commonly known as:  LIPITOR   This may have changed:  when to take this   Used for:  Carotid artery dissection (H)        Dose:  10 mg   Take 1 tablet (10 mg) by mouth daily   Quantity:  90 tablet   Refills:  3         CONTINUE these medicines which have NOT CHANGED        Dose / Directions    ACYCLOVIR PO        Dose:  800 mg   Take 800 mg by mouth 5 times daily PRN   Refills:  0       AMBIEN PO        Dose:  5 mg   Take 5 mg by mouth At Bedtime   Refills:  0       aspirin 81 MG EC tablet   Used for:  Fibromuscular dysplasia (H)        Dose:  81 mg   Take 1 tablet (81 mg) by mouth daily   Quantity:  90 tablet   Refills:  3       ATIVAN PO        Dose:  0.5 mg   Take 0.5 mg by mouth daily as needed   Refills:  0       calcium-vitamin D 600-400 MG-UNIT per tablet   Commonly known as:  CALTRATE        Dose:  1 tablet   Take 1 tablet by mouth daily   Refills:  0       DOXAZOSIN MESYLATE PO        Dose:  4 mg   Take 4 mg by mouth daily   Refills:  0       ESTRACE PO        Dose:  1 mg   Take 1 mg by mouth every evening   Refills:  0       GLUCOSAMINE 1500 COMPLEX Caps        Dose:  3000 mg   Take 3,000 mg by mouth every evening   Refills:  0       HYDROcodone-acetaminophen 5-325 MG per tablet   Commonly known as:  NORCO        Dose:  1-2 tablet   Take 1-2 tablets by mouth every 6 hours as needed for moderate to severe pain   Quantity:  12 tablet    Refills:  0       hydrocortisone 2.5 % cream   Commonly known as:  ANUSOL-HC        Place rectally daily as needed   Refills:  0       IMITREX PO        Dose:  100 mg   Take 100 mg by mouth once   Refills:  0       nicotine 21 MG/24HR 24 hr patch   Commonly known as:  NICODERM CQ        Dose:  1 patch   Place 1 patch onto the skin daily as needed   Refills:  0       phentermine 30 MG capsule        Dose:  30 mg   Take 30 mg by mouth daily as needed   Refills:  0       PROBIOTIC DAILY PO        Dose:  1 capsule   Take 1 capsule by mouth every evening   Refills:  0       RANITIDINE HCL PO        Dose:  75 mg   Take 75 mg by mouth daily   Refills:  0       YERVOY IV        Every three weeks.   Refills:  0                Protect others around you: Learn how to safely use, store and throw away your medicines at www.disposemymeds.org.             Medication List: This is a list of all your medications and when to take them. Check marks below indicate your daily home schedule. Keep this list as a reference.      Medications           Morning Afternoon Evening Bedtime As Needed    ACYCLOVIR PO   Take 800 mg by mouth 5 times daily PRN                                AMBIEN PO   Take 5 mg by mouth At Bedtime                                aspirin 81 MG EC tablet   Take 1 tablet (81 mg) by mouth daily                                ATIVAN PO   Take 0.5 mg by mouth daily as needed                                atorvastatin 10 MG tablet   Commonly known as:  LIPITOR   Take 1 tablet (10 mg) by mouth daily                                calcium-vitamin D 600-400 MG-UNIT per tablet   Commonly known as:  CALTRATE   Take 1 tablet by mouth daily                                DOXAZOSIN MESYLATE PO   Take 4 mg by mouth daily   Last time this was given:  4 mg on 3/4/2017  8:06 AM                                ESTRACE PO   Take 1 mg by mouth every evening                                GLUCOSAMINE 1500 COMPLEX Caps   Take 3,000 mg by  mouth every evening                                HYDROcodone-acetaminophen 5-325 MG per tablet   Commonly known as:  NORCO   Take 1-2 tablets by mouth every 6 hours as needed for moderate to severe pain                                hydrocortisone 2.5 % cream   Commonly known as:  ANUSOL-HC   Place rectally daily as needed                                IMITREX PO   Take 100 mg by mouth once                                nicotine 21 MG/24HR 24 hr patch   Commonly known as:  NICODERM CQ   Place 1 patch onto the skin daily as needed                                phentermine 30 MG capsule   Take 30 mg by mouth daily as needed                                PROBIOTIC DAILY PO   Take 1 capsule by mouth every evening                                RANITIDINE HCL PO   Take 75 mg by mouth daily                                ALBINO IV   Every three weeks.

## 2017-03-03 NOTE — ED PROVIDER NOTES
"  History     Chief Complaint:  Post-op Problem    HPI   Razia Almeida is a 51 year old female with a history of FMD and carotid artery dissection who presents to the emergency department today for evaluation of post-op problem. The patient had her first colonoscopy done on 1/9/17 and had some small polyps removed as well as one large polyp that was noted but unable to be removed. She had a follow up colonoscopy 2/10 to remove it but the patient states her surgeon was unable to get it. She had her third colonoscopy today, performed by Dr. Quispe, and had a 2 polyps in the cecum removed, with sizes of16-20 mm and 3 mm, as well as one 4 mm polyp from the descending colon. The patient had no pain following her first two colonoscopies but has had increasing pain following her third colonoscopies today. The patient woke up nauseous from the anesthesia and with the feeling of gas pain in her abdomen, despite being able to pass gas without problem. She notes this has worsened since that time. She went to eat after her procedure and could barely sit because of the pain. The patient got home, used the bathroom and noted some homero colored stool, laid down, took one [place] around 4 PM, and dozed off. She woke up with increased pain and passed gas as well as some rust color discharge. She has had 3-4 more of these episodes since that time but states \"there has only been a small amount of liquid blood\" in each stool. She also continues to have diffuse, burning abdominal pain. She describes that the car ride over was very painful due to the sitting and bumping. Of note, her previous abdominal surgeries include a hysterectomy and a tummy tuck.      Allergies:  No Known Drug Allergies     Medications:    Ativan   Aspirin   Yervoy  Lipitor   Norco  Caltrate   Acyclovir   Doxazosin   Estradiol   Anusol  Glucosamine-chondroitin   Imitrex  Ambien    Past Medical History:    Anxiety   Cancer  Neck pain, chronic   Carotid artery " "dissection   Metastatic melanoma     Past Surgical History:    Hc knee scope, w/lateral release  Hysterectomy   Biopsy/removal, lymph node  Abdomen surgery   Lumpectomy breast, dissect axillary node(s), combined (2016)     Family History:    Paternal Grandfather - Heart Disease  Sister - Breast Cancer  Maternal Grandmother - Cerebrovascular Disease, Hypertension     Social History:  The patient was accompanied to the ED by her .  Smoking Status: Former Smoker  Smokeless Tobacco: Negative  Alcohol Use: Occasionally  Marital Status:   [2]     Review of Systems   Gastrointestinal: Positive for abdominal pain and blood in stool.   All other systems reviewed and are negative.    Physical Exam   Vitals:   Patient Vitals for the past 24 hrs:   BP Temp Temp src Pulse Heart Rate Resp SpO2 Height Weight   03/02/17 2100 126/81 - - - - - 98 % - -   03/02/17 2028 116/75 - - - - - 98 % - -   03/02/17 1918 (!) 135/96 98.4  F (36.9  C) Temporal 125 125 18 100 % 1.626 m (5' 4\") 57.2 kg (126 lb)     Physical Exam    Constitutional:  Pleasant, age appropriate female in obvious discomfort  HEENT:    Oropharynx is moist, without lesions or trismus.  Eyes:    Conjunctiva normal  Neck:    Supple, no meningismus.     CV:     Regular rate and rhythm.      No murmurs, rubs or gallops  PULM:    Clear to auscultation bilateral.       No respiratory distress.      Good air exchange.     No rales or wheezing  ABD:    Soft, non-distended.       Moderate diffuse abdominal tenderness with voluntary guarding.     Bowel sounds normal.     No pulsatile masses.       No rebound or rigidity.     No CVA tenderness.      No hepatosplenomegaly.  MSK:     No gross deformity to all four extremities.   LYMPH:   No cervical lymphadenopathy.  NEURO:   Alert.  Good muscular tone, no atrophy.      Strength is equal and symmetric.  Skin:    Warm, dry and intact.    Psych:    Mood is good and affect is appropriate.        Emergency Department Course "     Imaging:  Radiology findings were communicated with the patient who voiced understanding of the findings.    CT Abdomen Pelvis w Contrast:   IMPRESSION:   1. Few mildly dilated fluid-filled loops of small bowel in the lower  abdomen, with no definite transition point identified. Findings  suggest ileus. A developing small bowel obstruction is considered less  likely, however, clinical correlation and follow-up are recommended.  2. No evidence for bowel perforation.  Reading per radiology    Laboratory:  Laboratory findings were communicated with the patient who voiced understanding of the findings.    UA with Microscopic reflex to Culture: Urineketon 5 (A), Mucous Urine: Present (A)    BMP: Potassium 3.3 (L), Glucose 137 (H), Calcium 8.3 (L) (Creatinine 0.77)  Blood gas venous and oxyhgb: Ph Venous 7.44 (H), PCO2 Venous 44, PO2 Venous 30, Bicarbonate Venous 29 (H)  Lactic Acid (Collected at 2013): 1.0  CBC: WBC 17.2 (H) o/w WNL. (HGB 14.0, )     Interventions:  2024 lactated ringers 1000 mL IV  2024 Dilaudid 1 mg IV     Emergency Department Course:  Nursing notes and vitals reviewed.  I performed an exam of the patient as documented above.   IV was inserted and blood was drawn for laboratory testing, results above.  The patient was sent for a CT while in the emergency department, results above.   The patient provided a urine sample here in the emergency department. This was sent for laboratory testing, findings above.  At 2120 the patient was rechecked and was updated on the results of her laboratory and imaging studies.   I discussed the treatment plan with the patient and . They expressed understanding of this plan and consented to discharge. They will be discharged home with instructions for care and follow up. In addition, the patient will return to the emergency department if their symptoms persist, worsen, if new symptoms arise or if there is any concern.  All questions were answered.  I  personally reviewed the laboratory and imaging results with the Patient and answered all related questions prior to discharge.    Impression & Plan      Medical Decision Making:  Razia Almeida is a 51 year old female who presents to the emergency department with post colonoscopy pain. Her history is most concerning for colonic perforation. The patient underwent advanced imaging with CT scan which fortunately revealed no evidence of perforation. Radiologist did remark there is a fair amount of air throughout that may represent early ileus. Clinical examination is not consistent with ileus and is likely related to the colonoscopy. The remainder of her laboratory studies are unremarkable. The patient felt improved with analgesics. She was able to tolerate PO challenge. Patient safe for discharge home with supportive treatment. Return to the emergency department for any new or developing symptoms.     Diagnosis:    ICD-10-CM    1. Abdominal pain, generalized R10.84      Disposition:   Discharge to home    Discharge Medications:  New Prescriptions    HYDROCODONE-ACETAMINOPHEN (NORCO) 5-325 MG PER TABLET    Take 1-2 tablets by mouth every 6 hours as needed for moderate to severe pain       Scribe Disclosure:  I, Razia Rousseau, am serving as a scribe at 7:35 PM on 3/2/2017 to document services personally performed by Adriano Hernandez MD, based on my observations and the provider's statements to me.    3/2/2017   [facility] EMERGENCY DEPARTMENT       Adriano Hernandez MD  03/02/17 5937

## 2017-03-03 NOTE — ED NOTES
Patient had polyps removed yesterday.  Today has large amounts of blood in stool and is feeling dizzy.      ABCs intact.  Alert and oriented x 3.

## 2017-03-03 NOTE — IP AVS SNAPSHOT
[facility] Observation Department    [address]    [facility] [address]    Phone:  [phone]                                       After Visit Summary   3/3/2017    Razia Almeida    MRN: [id number]           After Visit Summary Signature Page     I have received my discharge instructions, and my questions have been answered. I have discussed any challenges I see with this plan with the nurse or doctor.    ..........................................................................................................................................  Patient/Patient Representative Signature      ..........................................................................................................................................  Patient Representative Print Name and Relationship to Patient    ..................................................               ................................................  Date                                            Time    ..........................................................................................................................................  Reviewed by Signature/Title    ...................................................              ..............................................  Date                                                            Time

## 2017-03-03 NOTE — PROGRESS NOTES
ROOM #    Living Situation (if not independent, order SW consult): Ind  Facility name:    Activity level at baseline:Ind  Activity level on admit:Ind    Is patient a falls risk? No  Falls armband on? No  Within Arm's Reach? No.Reason not within arm's reach is:   Bed alarm turned on?   No  Personal alarm in place and turned on?   No    Patient registered to observation; given Patient Bill of Rights; given the opportunity to ask questions about observation status and their plan of care.  Patient has been oriented to the observation room, bathroom and call light is in place.    : Malcom Hollis

## 2017-03-03 NOTE — H&P
[facility]  Observation Unit  H & P      Patient Name: Razia Almeida MRN# [id number]   Age: 51 year old YOB: 1965     Date of Admission:3/3/2017    Primary care provider: Phong Jarvis  Date of Service: 3/3/2017         Assessment and Plan:   Razia Almeida is a 51 year old female with a history of Melanoma currently undergoing chemotherapy, Carotid Artery Dissection, Chronic Pain, Myofascial Pain Raynauds, Migraine Headaches  and Anxiety who presents to the ED today 2/2 rectal bleeding.      Hematochezia with Symptomatic Anemia - one day history of BRBPR s/p colonoscopy 3/2/17 with resection of a polyp.  Followed by [facility].  CT abd/pelvis 3/2 with findings of possible Ileus.  Now with 6 bloody stools overnight with tachycardia and lightheadedness.  Hbg 10.2 down from 14 yesterday.  - 2 large bore IV's  - IVF hydration  - type and screen/cross  - serial hgb's Q6  - PPI Protonix 40mg IV q12 hours  - GI consulted in the ED who recommends clear liquid diet for now.  If bleeding returns, npo and bowel prep tonight for scope in the morning.      Raynaud's Phenomenon - stable.  Continue home Doxazosin.     Chronic Pain - chronic knee pain on Norco pta.    Fibromuscluar Dysplasia - renal artery and hx of carotid dissection.  Previously on Plavix which was discontinued one month ago.  Currently on ASA daily.    - continue home Atorvastatin and hold ASA.       Metastatic Melanoma to Lymph Node - diagnosed in 2/2010 with recurrence 10/2016.  Currently followed by [facility] and undergoing Immunotherapy with Ipilimumab.  Last dose 2/11/17.  Next dose 4/27/2017.    Hx Tobacco Abuse - no longer smokes.  Uses nicotine patches prn during times of stress.     Anxiety, Insomnia - stable.  Continue home Zolpidem prn.  Does not take home Lorazepam often.    Hx of Migraine Headaches - asymptomatic.  Uses Sumatriptan prn    CODE: full  Diet/IVF: clear liquids, NS  GI ppx:  protonix  DVT ppx:  ANDREW Alexander MS, PA-C  Physician Assistant   Hospitalist Service  Pager: [phone]           Chief Complaint:   BRBPR         HPI:   51 year old male with a history of Melanoma currently undergoing chemotherapy, Carotid Artery Dissection, Chronic Pain, Myofascial Pain Raynaud's Disease, Migraine Headaches, Fibromuscular Dysplasia of the Renal Artery and Anxiety who presents to the ED today 2/2 rectal bleeding.     Patient presents to the ED today due to bright red bloody stools.  Patient has had 6 stools since ~0200 this morning.  Last episode was around 930am.  Patient reports she had a colonoscopy yesterday with [facility] with a polyp removal.  She reports having 3 colonoscopies since 1/9/17 for removal of polyps.  She was seen in the ED last night due to abdominal pain.  A CT Abd/Pelvis 3/2/17 revealed few mildly dilated fluid-filled loops of small bowel in the lower abdomen, with no definite transition point identified. Findings suggested ileus. A developing small bowel obstruction is considered less with no evidence for bowel perforation.  Patient reports her abdominal pain is still present in the central lower abdomen just below the umbilicus, but improved since yesterday.  She has been able to eat and drink.  Today, she felt lightheaded.  She called the GI clinic this morning and was instructed to present to the ED.  Patient reports she is on aspirin, but it has been on hold recently for her colonoscopy.        ED work up revealed patient tachycardic 111, bp 118/85.  Laboratory work up revealed hgb 10.2 down from 14.0 yesterday, wbc 11.3, INR 1.15 with otherwise normal BMP and CBC.           Past Medical History:     Past Medical History   Diagnosis Date     Anxiety      Cancer (H)      melanoma of left axillry lymphnodes recurrent 10/2016.     Carotid artery dissection (H)      Fibromuscular dysplasia (H)      renal artery     Knee pain, chronic      Migraine headache      Myofascial pain      Neck  pain, chronic      Raynaud's disease           Past Surgical History:     Past Surgical History   Procedure Laterality Date     Hc knee scope, w/lateral release       Hysterectomy       Biopsy/removal, lymph node(s)       Abdomen surgery       Lumpectomy breast, dissect axillary node(s), combined Left 11/4/2016     Procedure: COMBINED LUMPECTOMY BREAST, DISSECT AXILLARY NODE(S);  Surgeon: Iris Cota MD;  Location: [facility] OR          Social History:     Social History     Social History     Marital status:      Spouse name: N/A     Number of children: 2     Years of education: N/A     Occupational History      [employer]     Social History Main Topics     Smoking status: Former Smoker     Types: Cigarettes     Smokeless tobacco: Never Used      Comment: quit in 2011.     Alcohol use 0.0 - 0.6 oz/week     0 - 1 Standard drinks or equivalent per week      Comment: occas     Drug use: No     Sexual activity: Not on file     Other Topics Concern     Not on file     Social History Narrative          Family History:     Family History   Problem Relation Age of Onset     HEART DISEASE Paternal Grandfather      heart disease     CANCER Sister      breast     Breast Cancer Sister      CEREBROVASCULAR DISEASE Maternal Grandmother      Hypertension Maternal Grandmother           Allergies:    No Known Allergies       Medications:     Prior to Admission medications    Medication Sig Last Dose Taking? Auth Provider   LORazepam (ATIVAN PO) Take 0.5 mg by mouth once   Reported, Patient   HYDROcodone-acetaminophen (NORCO) 5-325 MG per tablet Take 1-2 tablets by mouth every 6 hours as needed for moderate to severe pain   Adriano Hernandez MD   aspirin 81 MG EC tablet Take 1 tablet (81 mg) by mouth daily   Junior Gutierrez MD   Ipilimumab (YERVOY IV)    Reported, Patient   atorvastatin (LIPITOR) 10 MG tablet Take 1 tablet (10 mg) by mouth daily   Rainer Onofre MD  "  HYDROcodone-acetaminophen (NORCO) 5-325 MG per tablet Take 1-2 tablets by mouth every 4 hours as needed for other (Moderate to Severe Pain)   Iris Cota MD   calcium-vitamin D (CALTRATE) 600-400 MG-UNIT per tablet Take 1 tablet by mouth daily   Reported, Patient   ACYCLOVIR PO Take 800 mg by mouth 5 times daily   Reported, Patient   DOXAZOSIN MESYLATE PO Take 2 mg by mouth daily   Reported, Patient   Estradiol (ESTRACE PO) Take 1 mg by mouth daily   Reported, Patient   glucosamine-chondroitin 500-400 MG CAPS Take 2 capsules by mouth daily   Reported, Patient   hydrocortisone (ANUSOL-HC) 2.5 % rectal cream Place rectally 2 times daily   Reported, Patient   nicotine (NICODERM CQ) 21 MG/24HR patch 2h hr Place 1 patch onto the skin every 24 hours   Reported, Patient   SUMAtriptan Succinate (IMITREX PO) Take 100 mg by mouth once   Reported, Patient   Zolpidem Tartrate (AMBIEN PO) Take 5 mg by mouth nightly as needed    Reported, Patient          Review of Systems:   A complete ROS was performed and is negative other than what is stated in the HPI.       Physical Exam:   Blood pressure 106/70, pulse 102, temperature 98.2  F (36.8  C), temperature source Temporal, resp. rate 14, height 1.626 m (5' 4\"), weight 57.2 kg (126 lb), SpO2 100 %, not currently breastfeeding.  General: Alert, interactive, NAD, sitting up in bed drinking diet coke and eating crackers  HEENT: AT/NC, sclera anicteric, PERRL, EOMI  Chest/Resp: clear to auscultation bilaterally, no crackles or wheezes  Heart/CV: regular rate and rhythm, no murmur, tachycardic  Abdomen/GI: Soft, mild tender of the central abdomen just below the unbilicus, nondistended. +BS.  No rebound or guarding.  Extremities/MSK: No LE edema  Skin: Warm and dry, no jaundice or rash  Neuro: Alert & oriented x 3, Cns 2-12 intact, moves all extremities equally         Labs:   ROUTINE ICU LABS (Last four results)  CMP  Recent Labs  Lab 03/03/17  1358 03/02/17 2013    139 "   POTASSIUM 3.7 3.3*   CHLORIDE 102 104   CO2 30 28   ANIONGAP 6 7   GLC 68* 137*   BUN 15 14   CR 0.85 0.77   GFRESTIMATED 71 79   GFRESTBLACK 85 >90African American GFR Calc   DEION 8.0* 8.3*     CBC  Recent Labs  Lab 03/03/17  1358 03/02/17 2013   WBC 11.3* 17.2*   RBC 3.60* 4.94   HGB 10.2* 14.0   HCT 31.7* 42.5   MCV 88 86   MCH 28.3 28.3   MCHC 32.2 32.9   RDW 12.7 12.6    290     INR  Recent Labs  Lab 03/03/17  1358   INR 1.15*     Arterial Blood Gas  Recent Labs  Lab 03/02/17 2013   O2PER Room Air          Imaging/Procedures:     Results for orders placed or performed during the hospital encounter of 03/02/17   CT Abdomen Pelvis w Contrast    Narrative    CT ABDOMEN AND PELVIS WITH CONTRAST   3/2/2017 8:42 PM     HISTORY: Severe diffuse abdominal pain. Status post colonoscopy.  Evaluate for perforation. History of melanoma.    TECHNIQUE: 63mL Isovue-370 IV were administered. After contrast  administration, volumetric helical sections were acquired from the  lung bases to the ischial tuberosities. Coronal images were also  reconstructed. Radiation dose for this scan was reduced using  automated exposure control, adjustment of the mA and/or kV according  to patient size, or iterative reconstruction technique.    COMPARISON: CT of the chest, abdomen, and pelvis performed 12/28/2016.      FINDINGS: No free intraperitoneal air is identified. No  intra-abdominal fluid collections to suggest abscess. There are a few  mildly dilated fluid-filled loops of small bowel in the pelvis, with  no definite transition point identified. No convincing evidence for  colitis or diverticulitis. No free fluid in the pelvis. The appendix  is not visualized. A tiny low-density lesion in the posterior segment  of the right hepatic lobe posteriorly measures 0.4 cm and is too small  to characterize, but appears unchanged. The liver, gallbladder,  spleen, adrenal glands, pancreas, and kidneys are otherwise  unremarkable. No  hydronephrosis. No enlarged lymph nodes are  identified in the abdomen or pelvis.      Impression    IMPRESSION:   1. Few mildly dilated fluid-filled loops of small bowel in the lower  abdomen, with no definite transition point identified. Findings  suggest ileus. A developing small bowel obstruction is considered less  likely, however, clinical correlation and follow-up are recommended.  2. No evidence for bowel perforation.    KARUNA WINTERS MD

## 2017-03-03 NOTE — ED NOTES
Cecum polyps removed at 1300 today. Increased pain, pt took norco without relief at 1600. States blood increasing to stool. ABCD's intact.

## 2017-03-03 NOTE — ED PROVIDER NOTES
History     Chief Complaint:  Rectal Bleeding      HPI   Razia Almeida is a 51 year old female with a history of FMD, carotid artery dissection, and colon polyps who presents today with multiple episodes of rectal bleeding.  The patient had a first of three colonoscopies done on 1/9/17 and had some small polyps removed as well as one large polyp that was unable to be removed. She had a follow up colonoscopy 2/10 to remove the last polyp, but the patient states it was unsuccessful. She had her third colonoscopy yesterday, performed by Dr. Quispe, and had 2 polyps in the cecum removed, with sizes of 16-20 mm and 3 mm, as well as one 4 mm polyp from the descending colon. This patient also presented to the ED yesterday for evaluation of burning abdominal pain and some blood in her stool, and was discharged with norco for pain relief. Today she presents with increased rectal bleeding and lightheadedness, however she notes that her abdominal pain has significantly subsided and she is still able to pass gas. Of note, the patient used to take a daily Aspirin, but stopped taking it a couple of days ago.    Allergies:  The patient has no known drug allergies.    Medications:    Ativan   Aspirin, not currently taking  Yervoy  Lipitor   Norco  Caltrate   Acyclovir   Doxazosin   Estradiol   Anusol  Glucosamine-chondroitin   Imitrex  Ambien  Nicoderm     Past Medical History:    Anxiety   Cancer  Neck pain, chronic   Carotid artery dissection   Metastatic melanoma   Raynaud's disease without gangrene    Past Surgical History:    Hc knee scope, w/lateral release  Hysterectomy   Biopsy/removal, lymph node  Abdomen surgery   Lumpectomy breast, dissect axillary node(s), combined (2016)      Family History:   Paternal Grandfather - Heart Disease  Sister - Breast Cancer  Maternal Grandmother - Cerebrovascular Disease, Hypertension     Social History:  The patient was accompanied to the ED by her sister.  Smoking Status: Former  "Smoker  Smokeless Tobacco: Negative  Alcohol Use: Occasionally  Marital Status:  [2]     Review of Systems   Gastrointestinal: Positive for abdominal pain and blood in stool.   Neurological: Positive for light-headedness.   All other systems reviewed and are negative.    Physical Exam   First Vitals:  BP: 118/85  Pulse: 111  Temp: 98.2  F (36.8  C)  Resp: 14  Height: 162.6 cm (5' 4\")  Weight: 57.2 kg (126 lb)  SpO2: 98 %    Physical Exam  Nursing note and vitals reviewed.  Constitutional: Cooperative.   HENT:   Mouth/Throat: Moist mucous membranes.   Eyes: EOMI, nonicteric sclera  Cardiovascular: tachycardic, regular rhythm, no murmurs, rubs, or gallops  Pulmonary/Chest: Effort normal and breath sounds normal. No respiratory distress. No wheezes. No rales.   Abdominal: Soft. Tenderness to palpation LLQ, nondistended, no guarding or rigidity. BS present. Rectal deferred given obvious evidence of lower bleed.  Musculoskeletal: Normal range of motion.   Neurological: Alert. Moves all extremities spontaneously.   Skin: Skin is warm and dry. No rash noted.   Psychiatric: Normal mood and affect.       Emergency Department Course   Laboratory:  CBC: WBC 11.3 (H), HGB 10.2 (L), )  BMP: Glucose 68 (L), o/w WNL (Creatinine 0.85)  ABO: A, Rh Pos, Antibody Neg  1358: INR: 1.15 (H)  1358: PTT: 39 (H)    Interventions:  1348: Normal Saline, 1000 mL, IV    Emergency Department Course:  Nursing notes and vitals reviewed.  I performed an exam of the patient as documented above.  The above workup was undertaken.  1420: I rechecked the patient and discussed results.  1519: I discussed the patient with TAVON Marin, for admission.  1522: I discussed the patient with Dr. Delgado of [facility].  1525: I updated the patient on her results and discussed admission.    Findings and plan explained to the Patient who consents to admission. Discussed the patient with Dr. Heaton, who will admit the patient to a med/surg bed " for further monitoring, evaluation, and treatment.    Impression & Plan    Medical Decision Making:  Razia Almeida is a 51 year old female who presents with complaint of multiple bloody stools after colonoscopy yesterday. Patient had a large polyp removed from her cecum in addition to a smaller one there, as well as her descending colon. The patient has had multiple bloody bowel movements over night, and also reports feeling dizzy today. She was mildly tachycardic on arrival, otherwise has normal vital signs. Abdominal exam is remarkable for some mild tenderness which she states is improved from yesterday. Hemoglobin is down 4 grams from 14 down to 10. I did explain that lower GI bleeding usually stops about 80% of the time on its own. Patient is very hesitant to undergo another colonoscopy as she has had 3 within the last 6 weeks because of these polyps. I contacted Dr. Delgado from [facility] who stated that if the patient were to continue having bleeding over night, that she should be made NPO and get a prep, otherwise she can be on clear liquids. I discussed this with her and her family at bedside and they are in agreement with this plan. She is in a stable condition at time of admission. Contacted hospitalist and spoke with TAVON Marin, who accepts the patient for admission.    Diagnosis:    ICD-10-CM   1. Lower GI bleed K92.2       Disposition:  Admitted to a med/surg bed under the care of Dr. Heaton.     Vivi DOWNING, am serving as a scribe on 3/3/2017 at 2:03 PM to personally document services performed by Regino Palacios MD, based on my observations and the provider's statements to me.  [facility] EMERGENCY DEPARTMENT       Regino Palacios MD  03/03/17 3029

## 2017-03-04 VITALS
TEMPERATURE: 96.7 F | DIASTOLIC BLOOD PRESSURE: 68 MMHG | WEIGHT: 126 LBS | SYSTOLIC BLOOD PRESSURE: 115 MMHG | BODY MASS INDEX: 21.51 KG/M2 | OXYGEN SATURATION: 100 % | HEIGHT: 64 IN | RESPIRATION RATE: 14 BRPM | HEART RATE: 90 BPM

## 2017-03-04 LAB
ANION GAP SERPL CALCULATED.3IONS-SCNC: 6 MMOL/L (ref 3–14)
BASOPHILS # BLD AUTO: 0 10E9/L (ref 0–0.2)
BASOPHILS NFR BLD AUTO: 0.1 %
BUN SERPL-MCNC: 6 MG/DL (ref 7–30)
CALCIUM SERPL-MCNC: 7.7 MG/DL (ref 8.5–10.1)
CHLORIDE SERPL-SCNC: 111 MMOL/L (ref 94–109)
CO2 SERPL-SCNC: 28 MMOL/L (ref 20–32)
CREAT SERPL-MCNC: 0.68 MG/DL (ref 0.52–1.04)
DIFFERENTIAL METHOD BLD: ABNORMAL
EOSINOPHIL # BLD AUTO: 0.3 10E9/L (ref 0–0.7)
EOSINOPHIL NFR BLD AUTO: 4.5 %
ERYTHROCYTE [DISTWIDTH] IN BLOOD BY AUTOMATED COUNT: 13 % (ref 10–15)
GFR SERPL CREATININE-BSD FRML MDRD: ABNORMAL ML/MIN/1.7M2
GLUCOSE SERPL-MCNC: 85 MG/DL (ref 70–99)
HCT VFR BLD AUTO: 26 % (ref 35–47)
HGB BLD-MCNC: 8.3 G/DL (ref 11.7–15.7)
HGB BLD-MCNC: 8.3 G/DL (ref 11.7–15.7)
IMM GRANULOCYTES # BLD: 0 10E9/L (ref 0–0.4)
IMM GRANULOCYTES NFR BLD: 0.1 %
LYMPHOCYTES # BLD AUTO: 2.8 10E9/L (ref 0.8–5.3)
LYMPHOCYTES NFR BLD AUTO: 39.3 %
MCH RBC QN AUTO: 28.6 PG (ref 26.5–33)
MCHC RBC AUTO-ENTMCNC: 31.9 G/DL (ref 31.5–36.5)
MCV RBC AUTO: 90 FL (ref 78–100)
MONOCYTES # BLD AUTO: 0.4 10E9/L (ref 0–1.3)
MONOCYTES NFR BLD AUTO: 5.7 %
NEUTROPHILS # BLD AUTO: 3.5 10E9/L (ref 1.6–8.3)
NEUTROPHILS NFR BLD AUTO: 50.3 %
NRBC # BLD AUTO: 0 10*3/UL
NRBC BLD AUTO-RTO: 0 /100
PLATELET # BLD AUTO: 206 10E9/L (ref 150–450)
POTASSIUM SERPL-SCNC: 3.9 MMOL/L (ref 3.4–5.3)
RBC # BLD AUTO: 2.9 10E12/L (ref 3.8–5.2)
SODIUM SERPL-SCNC: 145 MMOL/L (ref 133–144)
WBC # BLD AUTO: 7.1 10E9/L (ref 4–11)

## 2017-03-04 PROCEDURE — 85025 COMPLETE CBC W/AUTO DIFF WBC: CPT | Performed by: PHYSICIAN ASSISTANT

## 2017-03-04 PROCEDURE — 85018 HEMOGLOBIN: CPT | Mod: 91 | Performed by: PHYSICIAN ASSISTANT

## 2017-03-04 PROCEDURE — 36415 COLL VENOUS BLD VENIPUNCTURE: CPT | Performed by: PHYSICIAN ASSISTANT

## 2017-03-04 PROCEDURE — 96361 HYDRATE IV INFUSION ADD-ON: CPT

## 2017-03-04 PROCEDURE — G0378 HOSPITAL OBSERVATION PER HR: HCPCS

## 2017-03-04 PROCEDURE — 80048 BASIC METABOLIC PNL TOTAL CA: CPT | Performed by: PHYSICIAN ASSISTANT

## 2017-03-04 PROCEDURE — 25000125 ZZHC RX 250: Performed by: PHYSICIAN ASSISTANT

## 2017-03-04 PROCEDURE — 96376 TX/PRO/DX INJ SAME DRUG ADON: CPT

## 2017-03-04 PROCEDURE — 25000128 H RX IP 250 OP 636: Performed by: PHYSICIAN ASSISTANT

## 2017-03-04 PROCEDURE — 25000132 ZZH RX MED GY IP 250 OP 250 PS 637: Performed by: PHYSICIAN ASSISTANT

## 2017-03-04 PROCEDURE — 99217 ZZC OBSERVATION CARE DISCHARGE: CPT | Performed by: PHYSICIAN ASSISTANT

## 2017-03-04 RX ADMIN — PANTOPRAZOLE SODIUM 40 MG: 40 INJECTION, POWDER, FOR SOLUTION INTRAVENOUS at 08:06

## 2017-03-04 RX ADMIN — ACETAMINOPHEN 650 MG: 325 TABLET, FILM COATED ORAL at 02:06

## 2017-03-04 RX ADMIN — DOXAZOSIN MESYLATE 4 MG: 4 TABLET ORAL at 08:06

## 2017-03-04 RX ADMIN — SODIUM CHLORIDE: 9 INJECTION, SOLUTION INTRAVENOUS at 05:15

## 2017-03-04 NOTE — PLAN OF CARE
Problem: Discharge Planning  Goal: Discharge Planning (Adult, OB, Behavioral, Peds)  Outcome: No Change  PRIMARY DIAGNOSIS: GI BLEED/ANEMIA  OUTPATIENT/OBSERVATION GOALS TO BE MET BEFORE DISCHARGE:  1.     Vital Signs stable: Yes  2.     Pain status: Pain free.  3.     Number of Bleeding Episodes during this shift: none  4.     ADLs back to baseline? Yes  5.     Cleared by consultants? No. GI consult tomorrow  6.     Barriers to discharge noted: No  7.     Interpretation of rhythm per telemetry tech:    Please review provider order for any additional goals.      Nurse to notify provider when observation goals have been met and patient is ready for discharge.

## 2017-03-04 NOTE — PLAN OF CARE
Problem: Discharge Planning  Goal: Discharge Planning (Adult, OB, Behavioral, Peds)  Outcome: No Change  PRIMARY DIAGNOSIS: GI BLEED/ANEMIA  OUTPATIENT/OBSERVATION GOALS TO BE MET BEFORE DISCHARGE:  1.     Vital Signs stable: Yes  2.     Pain status: Pain free.  3.     Number of Bleeding Episodes during this shift: none  4.     ADLs back to baseline? Yes  5.     Cleared by consultants? No. GI consult tomorrow  6.     Barriers to discharge noted: No  7.     Interpretation of rhythm per telemetry tech:  NA  Pt is alert and oriented x4. Vitals are stable. No bleeding episode this shift. Will continue to assess.  Please review provider order for any additional goals.      Nurse to notify provider when observation goals have been met and patient is ready for discharge.

## 2017-03-04 NOTE — PLAN OF CARE
Problem: Discharge Planning  Goal: Discharge Planning (Adult, OB, Behavioral, Peds)  Outcome: No Change  PRIMARY DIAGNOSIS: GI BLEED/ANEMIA  OUTPATIENT/OBSERVATION GOALS TO BE MET BEFORE DISCHARGE:  1. Vital Signs stable: Yes  2. Pain status: Pain free.  3. Number of Bleeding Episodes during this shift: none  4. ADLs back to baseline? Yes  5. Cleared by consultants? No. GI consult today.  6. Barriers to discharge noted: No  7. Interpretation of rhythm per telemetry tech:  NA  Pt is alert and oriented x4. Vitals are stable. Hgb: 10.2, 9.1, 8.3 next at 0600. Denies any dizziness. Pt c/o lower abdominal pain - tylenol given, heating pad applied. No bleeding episodes this shift or since she has been admitted to Obs. Will continue to assess.  Please review provider order for any additional goals.       Nurse to notify provider when observation goals have been met and patient is ready for discharge.

## 2017-03-04 NOTE — DISCHARGE SUMMARY
[facility]    Observation Unit   Discharge Summary  Hospitalist    Date of Admission:  3/3/2017  Date of Discharge:  3/4/2017  Provider:  Jana Sharma PA-C  Date of Service (when I last saw the patient): 03/04/17    Discharge Diagnoses   Lower GI bleed    Other medical issues:  Past Medical History   Diagnosis Date     Anxiety      Cancer (H)      melanoma of left axillry lymphnodes recurrent 10/2016.     Carotid artery dissection (H)      Fibromuscular dysplasia (H)      renal artery     Knee pain, chronic      Migraine headache      Myofascial pain      Neck pain, chronic      Raynaud's disease      History of Present Illness   Razia Almeida is an 51 year old female with PMH significant for h/o melanoma currently undergoing chemotherapy, carotid artery dissection, chronic pain, myofascial pain, raynauds, migraine headaches, and anxiety who presents with rectal bleeding. Please see the admission history and physical for full details.    Hospital Course   Razia Almeida was admitted on 3/3/2017.  The following problems were addressed during her hospitalization:    1. Hematochezia with symptomatic anemia: presented with 1 day h/o BRBPR s/p colonoscopy 3/2/17 with resection of polyp by Dr. Quispe of [facility]. CT of abdomen/pelvis on 3/2 showed possible ileus. Patient had a total of 6 bloody stools overnight and initially tachycardic with lightheadedness. Hgb initially 10.2 with serial hemoglobins stable down to 8.3 this morning. Patient was treated supportively overnight with IVF and IV Protonix. Patient is currently asymptomatic and had tolerated advancing her diet without recurrence of BRBPR since admission.   2. Metastatic melanoma to lymph node: dx in 2/2010 with recurrence in 10/2016. Follows with [facility] and undergoing immunotherapy with Ipilimumab, last dose on 2/11/17. Next dose scheduled for 4/27/17.   3. Fibromuscular dysplasia: renal artery and h/o carotid dissection. Previously on  "Plavix which was discontinued 1 month ago. Currently on ASA daily, currently held for colonoscopy and #1. Continue Atorvastatin.     Significant Results and Procedures     Recent Labs  Lab 03/04/17  0538 03/04/17  0007 03/03/17  1755 03/03/17  1358 03/02/17 2013   WBC 7.1  --   --  11.3* 17.2*   HGB 8.3* 8.3* 9.1* 10.2* 14.0   HCT 26.0*  --   --  31.7* 42.5   MCV 90  --   --  88 86     --   --  286 290     Pending Results   None    Code Status   Full Code       Primary Care Physician   Phong Jarvis    Exam:    /67  Pulse 90  Temp 97.9  F (36.6  C) (Oral)  Resp 16  Ht 1.626 m (5' 4\")  Wt 57.2 kg (126 lb)  SpO2 99%  BMI 21.63 kg/m2  General: A&O x3, NAD, pleasant   HEENT: AT/NC, sclera anicteric, PERRL, EOMI  Chest/Resp: clear to auscultation bilaterally, no crackles or wheezes  Heart/CV: regular rate and rhythm, no murmur, rubs, or gallops   Abdomen/GI: Soft, mild tenderness in lower quadrants, nondistended, normal bowel sounds. No rebound or guarding.  Extremities/MSK: No LE edema  Skin: Warm and dry, no jaundice or rash  Neuro: Alert & oriented x 3, Cns 2-12 intact, moves all extremities equally    Discharge Disposition   Discharged to home    Consultations This Hospital Stay   None    Time Spent on this Encounter   I, Odalis Sharma, personally saw the patient today and spent less than or equal to 30 minutes discharging this patient.    Discharge Orders     Reason for your hospital stay   You were admitted for concerns of a gastrointestinal bleed after a recent colonoscopy and polyp resection. Your workup included a CT scan of your abdomen which showed concern for slowing of your bowel but no other findings. Serial hemoglobins were monitored overnight and remained stable. As well you were treated with IV fluids and your diet was slowly advanced without recurrence of bloody stools or abdominal pain. You are safe to discharge home.     Follow-up and recommended labs and tests    Follow up " with primary care provider, Phong Jarvis, within 7 days for hospital follow- up.  No follow up labs or test are needed.  Follow up with [facility] per their recommendations, contact them on Monday to discuss if outpatient follow up is needed.     Activity   Your activity upon discharge: activity as tolerated     Discharge Instructions   Continue to hold daily aspirin as instructed for procedure and resume as instructed by GI.     Full Code     Diet   Follow this diet upon discharge: Orders Placed This Encounter     Advance Diet as Tolerated: Regular Diet Adult       Discharge Medications   Current Discharge Medication List      CONTINUE these medications which have NOT CHANGED    Details   Glucosamine-Chondroit-Vit C-Mn (GLUCOSAMINE 1500 COMPLEX) CAPS Take 3,000 mg by mouth every evening      Probiotic Product (PROBIOTIC DAILY PO) Take 1 capsule by mouth every evening      RANITIDINE HCL PO Take 75 mg by mouth daily      phentermine 30 MG capsule Take 30 mg by mouth daily as needed      LORazepam (ATIVAN PO) Take 0.5 mg by mouth daily as needed       HYDROcodone-acetaminophen (NORCO) 5-325 MG per tablet Take 1-2 tablets by mouth every 6 hours as needed for moderate to severe pain  Qty: 12 tablet, Refills: 0      aspirin 81 MG EC tablet Take 1 tablet (81 mg) by mouth daily  Qty: 90 tablet, Refills: 3    Associated Diagnoses: Fibromuscular dysplasia (H)      Ipilimumab (YERVOY IV) Every three weeks.      atorvastatin (LIPITOR) 10 MG tablet Take 1 tablet (10 mg) by mouth daily  Qty: 90 tablet, Refills: 3    Associated Diagnoses: Carotid artery dissection (H)      calcium-vitamin D (CALTRATE) 600-400 MG-UNIT per tablet Take 1 tablet by mouth daily      DOXAZOSIN MESYLATE PO Take 4 mg by mouth daily       Estradiol (ESTRACE PO) Take 1 mg by mouth every evening       hydrocortisone (ANUSOL-HC) 2.5 % rectal cream Place rectally daily as needed       nicotine (NICODERM CQ) 21 MG/24HR patch 2h hr Place 1 patch onto the  skin daily as needed       SUMAtriptan Succinate (IMITREX PO) Take 100 mg by mouth once      Zolpidem Tartrate (AMBIEN PO) Take 5 mg by mouth At Bedtime       ACYCLOVIR PO Take 800 mg by mouth 5 times daily PRN           Allergies   No Known Allergies     Data   Results for orders placed or performed during the hospital encounter of 03/03/17   CBC with platelets differential   Result Value Ref Range    WBC 11.3 (H) 4.0 - 11.0 10e9/L    RBC Count 3.60 (L) 3.8 - 5.2 10e12/L    Hemoglobin 10.2 (L) 11.7 - 15.7 g/dL    Hematocrit 31.7 (L) 35.0 - 47.0 %    MCV 88 78 - 100 fl    MCH 28.3 26.5 - 33.0 pg    MCHC 32.2 31.5 - 36.5 g/dL    RDW 12.7 10.0 - 15.0 %    Platelet Count 286 150 - 450 10e9/L    Diff Method Automated Method     % Neutrophils 69.2 %    % Lymphocytes 23.0 %    % Monocytes 4.9 %    % Eosinophils 2.4 %    % Basophils 0.2 %    % Immature Granulocytes 0.3 %    Nucleated RBCs 0 0 /100    Absolute Neutrophil 7.8 1.6 - 8.3 10e9/L    Absolute Lymphocytes 2.6 0.8 - 5.3 10e9/L    Absolute Monocytes 0.6 0.0 - 1.3 10e9/L    Absolute Eosinophils 0.3 0.0 - 0.7 10e9/L    Absolute Basophils 0.0 0.0 - 0.2 10e9/L    Abs Immature Granulocytes 0.0 0 - 0.4 10e9/L    Absolute Nucleated RBC 0.0    Basic metabolic panel   Result Value Ref Range    Sodium 138 133 - 144 mmol/L    Potassium 3.7 3.4 - 5.3 mmol/L    Chloride 102 94 - 109 mmol/L    Carbon Dioxide 30 20 - 32 mmol/L    Anion Gap 6 3 - 14 mmol/L    Glucose 68 (L) 70 - 99 mg/dL    Urea Nitrogen 15 7 - 30 mg/dL    Creatinine 0.85 0.52 - 1.04 mg/dL    GFR Estimate 71 >60 mL/min/1.7m2    GFR Estimate If Black 85 >60 mL/min/1.7m2    Calcium 8.0 (L) 8.5 - 10.1 mg/dL   INR   Result Value Ref Range    INR 1.15 (H) 0.86 - 1.14   PTT   Result Value Ref Range    PTT 39 (H) 22 - 37 sec   Hemoglobin   Result Value Ref Range    Hemoglobin 9.1 (L) 11.7 - 15.7 g/dL   Hemoglobin   Result Value Ref Range    Hemoglobin 8.3 (L) 11.7 - 15.7 g/dL   Basic metabolic panel   Result Value Ref  Range    Sodium 145 (H) 133 - 144 mmol/L    Potassium 3.9 3.4 - 5.3 mmol/L    Chloride 111 (H) 94 - 109 mmol/L    Carbon Dioxide 28 20 - 32 mmol/L    Anion Gap 6 3 - 14 mmol/L    Glucose 85 70 - 99 mg/dL    Urea Nitrogen 6 (L) 7 - 30 mg/dL    Creatinine 0.68 0.52 - 1.04 mg/dL    GFR Estimate >90  Non  GFR Calc   >60 mL/min/1.7m2    GFR Estimate If Black >90   GFR Calc   >60 mL/min/1.7m2    Calcium 7.7 (L) 8.5 - 10.1 mg/dL   CBC with platelets differential   Result Value Ref Range    WBC 7.1 4.0 - 11.0 10e9/L    RBC Count 2.90 (L) 3.8 - 5.2 10e12/L    Hemoglobin 8.3 (L) 11.7 - 15.7 g/dL    Hematocrit 26.0 (L) 35.0 - 47.0 %    MCV 90 78 - 100 fl    MCH 28.6 26.5 - 33.0 pg    MCHC 31.9 31.5 - 36.5 g/dL    RDW 13.0 10.0 - 15.0 %    Platelet Count 206 150 - 450 10e9/L    Diff Method Automated Method     % Neutrophils 50.3 %    % Lymphocytes 39.3 %    % Monocytes 5.7 %    % Eosinophils 4.5 %    % Basophils 0.1 %    % Immature Granulocytes 0.1 %    Nucleated RBCs 0 0 /100    Absolute Neutrophil 3.5 1.6 - 8.3 10e9/L    Absolute Lymphocytes 2.8 0.8 - 5.3 10e9/L    Absolute Monocytes 0.4 0.0 - 1.3 10e9/L    Absolute Eosinophils 0.3 0.0 - 0.7 10e9/L    Absolute Basophils 0.0 0.0 - 0.2 10e9/L    Abs Immature Granulocytes 0.0 0 - 0.4 10e9/L    Absolute Nucleated RBC 0.0    ABO/Rh type and screen   Result Value Ref Range    ABO A     RH(D)  Pos     Antibody Screen Neg     Test Valid Only At [facility]     Specimen Expires 03/06/2017        Odalis Sharma PA-C

## 2017-03-04 NOTE — PLAN OF CARE
Problem: Discharge Planning  Goal: Discharge Planning (Adult, OB, Behavioral, Peds)  Outcome: No Change  PRIMARY DIAGNOSIS: GI BLEED/ANEMIA  OUTPATIENT/OBSERVATION GOALS TO BE MET BEFORE DISCHARGE:  1. Vital Signs stable: Yes  2. Pain status: Pain free.  3. Number of Bleeding Episodes during this shift: none  4. ADLs back to baseline? Yes  5. Cleared by consultants? No. GI consult today.  6. Barriers to discharge noted: No  7. Interpretation of rhythm per telemetry tech:  NA  Pt is alert and oriented x4. Vitals are stable.  Hgb: 10.2, 9.1, 8.3 next at 0600. Denies any dizziness or abdominal pain or cramping.  No bleeding episodes this shift or since she has been admitted to Obs. Will continue to assess.  Please review provider order for any additional goals.       Nurse to notify provider when observation goals have been met and patient is ready for discharge.

## 2017-03-04 NOTE — PLAN OF CARE
Problem: Discharge Planning  Goal: Discharge Planning (Adult, OB, Behavioral, Peds)  Outcome: Improving  PRIMARY DIAGNOSIS: GI BLEED/ANEMIA  OUTPATIENT/OBSERVATION GOALS TO BE MET BEFORE DISCHARGE:  1. Vital Signs stable: Yes  2. Pain status: Pain free.  3. Number of Bleeding Episodes during this shift: none  4. ADLs back to baseline? Yes  5. Cleared by consultants? Yes  6. Barriers to discharge noted: No  7. Interpretation of rhythm per telemetry tech:  NA  Pt is alert and oriented x4. Vitals are stable. Hgb: 10.2, 9.1, 8.3/8.3. Denies any dizziness. No bleeding episodes this shift or since she has been admitted to Obs. Will continue to monitor and provide supportive cares.

## 2017-04-27 ENCOUNTER — TRANSFERRED RECORDS (OUTPATIENT)
Dept: SURGERY | Facility: CLINIC | Age: 52
End: 2017-04-27

## 2017-07-08 ENCOUNTER — NURSE TRIAGE (OUTPATIENT)
Dept: NURSING | Facility: CLINIC | Age: 52
End: 2017-07-08

## 2017-07-08 ENCOUNTER — HOSPITAL ENCOUNTER (EMERGENCY)
Facility: CLINIC | Age: 52
Discharge: HOME | End: 2017-07-08
Attending: EMERGENCY MEDICINE | Admitting: EMERGENCY MEDICINE
Payer: COMMERCIAL

## 2017-07-08 VITALS
SYSTOLIC BLOOD PRESSURE: 130 MMHG | BODY MASS INDEX: 21.85 KG/M2 | DIASTOLIC BLOOD PRESSURE: 97 MMHG | HEART RATE: 94 BPM | RESPIRATION RATE: 16 BRPM | TEMPERATURE: 98.1 F | HEIGHT: 64 IN | OXYGEN SATURATION: 99 % | WEIGHT: 128 LBS

## 2017-07-08 DIAGNOSIS — S13.9XXA SPRAIN OF NECK, INITIAL ENCOUNTER: ICD-10-CM

## 2017-07-08 PROCEDURE — 99283 EMERGENCY DEPT VISIT LOW MDM: CPT

## 2017-07-08 PROCEDURE — 25000132 ZZH RX MED GY IP 250 OP 250 PS 637: Performed by: EMERGENCY MEDICINE

## 2017-07-08 RX ORDER — IBUPROFEN 600 MG/1
600 TABLET, FILM COATED ORAL EVERY 6 HOURS PRN
Qty: 30 TABLET | Refills: 1 | Status: SHIPPED | OUTPATIENT
Start: 2017-07-08

## 2017-07-08 RX ORDER — CYCLOBENZAPRINE HCL 10 MG
10 TABLET ORAL ONCE
Status: COMPLETED | OUTPATIENT
Start: 2017-07-08 | End: 2017-07-08

## 2017-07-08 RX ORDER — IBUPROFEN 600 MG/1
600 TABLET, FILM COATED ORAL ONCE
Status: COMPLETED | OUTPATIENT
Start: 2017-07-08 | End: 2017-07-08

## 2017-07-08 RX ORDER — CYCLOBENZAPRINE HCL 10 MG
10 TABLET ORAL 3 TIMES DAILY PRN
Qty: 20 TABLET | Refills: 0 | Status: SHIPPED | OUTPATIENT
Start: 2017-07-08 | End: 2017-07-14

## 2017-07-08 RX ADMIN — CYCLOBENZAPRINE HYDROCHLORIDE 10 MG: 10 TABLET, FILM COATED ORAL at 17:35

## 2017-07-08 RX ADMIN — IBUPROFEN 600 MG: 600 TABLET ORAL at 17:35

## 2017-07-08 ASSESSMENT — ENCOUNTER SYMPTOMS
DIZZINESS: 0
NECK STIFFNESS: 1
WOUND: 0
LIGHT-HEADEDNESS: 0
HEADACHES: 0
NECK PAIN: 1
COLOR CHANGE: 0
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
## 2017-07-08 NOTE — ED AVS SNAPSHOT
[facility] Emergency Department    [address]    [facility] [address]    Phone:  [phone]    Fax:  [phone]                                       Razia Almeida   MRN: [id number]    Department:  [facility] Emergency Department   Date of Visit:  7/8/2017           After Visit Summary Signature Page     I have received my discharge instructions, and my questions have been answered. I have discussed any challenges I see with this plan with the nurse or doctor.    ..........................................................................................................................................  Patient/Patient Representative Signature      ..........................................................................................................................................  Patient Representative Print Name and Relationship to Patient    ..................................................               ................................................  Date                                            Time    ..........................................................................................................................................  Reviewed by Signature/Title    ...................................................              ..............................................  Date                                                            Time

## 2017-07-08 NOTE — ED AVS SNAPSHOT
[facility] Emergency Department    [address]    [facility] [address]    Phone:  [phone]    Fax:  [phone]                                       Razia Almeida   MRN: [id number]    Department:  [facility] Emergency Department   Date of Visit:  7/8/2017           Patient Information     Date Of Birth          1965        Your diagnoses for this visit were:     Sprain of neck, initial encounter        You were seen by Josh Blue MD.      Follow-up Information     Follow up with Phong Jarvis MD In 1 week.    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      Discharge Instructions  Neck Strain    You have been seen today for a neck sprain or strain.  Neck strains usually result from an injury to the neck. Car accidents, contact sports and falls are common causes of neck strain. Sometimes your neck can start to hurt because of increased activity, muscle tension, an abnormal sleeping position, or because of other problems like arthritis in the neck.     Neck pain usually comes from injured muscles and ligaments. Sometimes there is a herniated ( slipped ) disc. We don t usually do MRI scans to look for these right away, since most herniated discs will get better on their own with time. Today, we did not find any evidence that your neck pain was caused by a serious condition, such as an infection, fracture, or tumor. However, sometimes symptoms develop over time and cannot be found during an emergency visit, so it is very  important that you follow up with your primary doctor.    Return to the Emergency Department if:    You have increasing pain in your neck.    You develop difficulty swallowing or breathing.    You have numbness, weakness, or trouble moving your arms or legs.    You have severe dizziness and difficulty walking.    You are unable to control your bladder or bowels.    You develop severe headache or ringing in the ears.    Call your doctor if:     Your neck pain is not controlled with the medicine we gave you.    You are not back to normal within 1 week.    What can I do to help myself at home?    If you had an injury, use cold for the first 1-2 days. Cold helps relieve pain and reduce inflammation.  Apply ice packs to the neck or areas of pain every 1-2 hours for 20 minutes at a time. Place a towel or cloth between your skin and the ice pack.    After the first 2 days, using heat can help with neck pain and stiffness. You may use a warm shower or bath, warm towels on the neck, or a heating pad. Do not sleep with a heating pad, as you can be burned.     Pain medications - You may take a pain medication such as Tylenol  (acetaminophen), Advil , Nuprin  (ibuprofen) or Aleve  (naproxen).  If you have been given a narcotic such as Vicodin  (hydrocodone with acetaminophen), Percocet  (oxycodone with acetaminophen), codeine, or a muscle relaxant such as Flexeril  (cyclobenzaprine) or Soma  (carisoprodol), do not drive for four hours after you have taken it. If the narcotic contains Tylenol  (acetaminophen), do not take Tylenol  with it. All narcotics will cause constipation, so eat a high fiber diet.      It is usually best to rest the neck for 1-2 days after an injury, then start gentle stretching exercises.     It is helpful to place a small pillow under the nape of your neck to provide proper neutral positioning.     You should stay active and do your usual work as much as you can, unless this involves heavy physical  labor. Ask your doctor if you need work restrictions.  If you were given a prescription for medicine here today, be sure to read all of the information (including the package insert) that comes with your prescription.  This will include important information about the medicine, its side effects, and any warnings that you need to know about.  The pharmacist who fills the prescription can provide more information and answer questions you may have about the medicine.  If you have questions or concerns that the pharmacist cannot address, please call or return to the Emergency Department.   Opioid Medication Information    Pain medications are among the most commonly prescribed medicines, so we are including this information for all our patients. If you did not receive pain medication or get a prescription for pain medicine, you can ignore it.     You may have been given a prescription for an opioid (narcotic) pain medicine and/or have received a pain medicine while here in the Emergency Department. These medicines can make you drowsy or impaired. You must not drive, operate dangerous equipment, or engage in any other dangerous activities while taking these medications. If you drive while taking these medications, you could be arrested for DUI, or driving under the influence. Do not drink any alcohol while you are taking these medications.     Opioid pain medications can cause addiction. If you have a history of chemical dependency of any type, you are at a higher risk of becoming addicted to pain medications.  Only take these prescribed medications to treat your pain when all other options have been tried. Take it for as short a time and as few doses as possible. Store your pain pills in a secure place, as they are frequently stolen and provide a dangerous opportunity for children or visitors in your house to start abusing these powerful medications. We will not replace any lost or stolen medicine.  As soon as your pain  is better, you should flush all your remaining medication.     Many prescription pain medications contain Tylenol  (acetaminophen), including Vicodin , Tylenol #3 , Norco , Lortab , and Percocet .  You should not take any extra pills of Tylenol  if you are using these prescription medications or you can get very sick.  Do not ever take more than 3000 mg of acetaminophen in any 24 hour period.    All opioids tend to cause constipation. Drink plenty of water and eat foods that have a lot of fiber, such as fruits, vegetables, prune juice, apple juice and high fiber cereal.  Take a laxative if you don t move your bowels at least every other day. Miralax , Milk of Magnesia, Colace , or Senna  can be used to keep you regular.      Remember that you can always come back to the Emergency Department if you are not able to see your regular doctor in the amount of time listed above, if you get any new symptoms, or if there is anything that worries you.        24 Hour Appointment Hotline       To make an appointment at any [facility], call [phone] ([phone]). If you don't have a family doctor or clinic, we will help you find one. [facility] are conveniently located to serve the needs of you and your family.             Review of your medicines      START taking        Dose / Directions Last dose taken    cyclobenzaprine 10 MG tablet   Commonly known as:  FLEXERIL   Dose:  10 mg   Quantity:  20 tablet        Take 1 tablet (10 mg) by mouth 3 times daily as needed for muscle spasms   Refills:  0        ibuprofen 600 MG tablet   Commonly known as:  ADVIL/MOTRIN   Dose:  600 mg   Quantity:  30 tablet        Take 1 tablet (600 mg) by mouth every 6 hours as needed for moderate pain   Refills:  1          Our records show that you are taking the medicines listed below. If these are incorrect, please call your family doctor or clinic.        Dose / Directions Last dose taken    ACYCLOVIR PO   Dose:  800 mg         Take 800 mg by mouth 5 times daily PRN   Refills:  0        AMBIEN PO   Dose:  5 mg        Take 5 mg by mouth At Bedtime   Refills:  0        aspirin 81 MG EC tablet   Dose:  81 mg   Quantity:  90 tablet        Take 1 tablet (81 mg) by mouth daily   Refills:  3        ATIVAN PO   Dose:  0.5 mg        Take 0.5 mg by mouth daily as needed   Refills:  0        atorvastatin 10 MG tablet   Commonly known as:  LIPITOR   Dose:  10 mg   Quantity:  90 tablet        Take 1 tablet (10 mg) by mouth daily   Refills:  3        calcium-vitamin D 600-400 MG-UNIT per tablet   Commonly known as:  CALTRATE   Dose:  1 tablet        Take 1 tablet by mouth daily   Refills:  0        DOXAZOSIN MESYLATE PO   Dose:  4 mg        Take 4 mg by mouth daily   Refills:  0        ESTRACE PO   Dose:  1 mg        Take 1 mg by mouth every evening   Refills:  0        GLUCOSAMINE 1500 COMPLEX Caps   Dose:  3000 mg        Take 3,000 mg by mouth every evening   Refills:  0        HYDROcodone-acetaminophen 5-325 MG per tablet   Commonly known as:  NORCO   Dose:  1-2 tablet   Quantity:  12 tablet        Take 1-2 tablets by mouth every 6 hours as needed for moderate to severe pain   Refills:  0        hydrocortisone 2.5 % cream   Commonly known as:  ANUSOL-HC        Place rectally daily as needed   Refills:  0        IMITREX PO   Dose:  100 mg        Take 100 mg by mouth once   Refills:  0        nicotine 21 MG/24HR 24 hr patch   Commonly known as:  NICODERM CQ   Dose:  1 patch        Place 1 patch onto the skin daily as needed   Refills:  0        phentermine 30 MG capsule   Dose:  30 mg        Take 30 mg by mouth daily as needed   Refills:  0        PROBIOTIC DAILY PO   Dose:  1 capsule        Take 1 capsule by mouth every evening   Refills:  0        RANITIDINE HCL PO   Dose:  75 mg        Take 75 mg by mouth daily   Refills:  0        YERVOY IV        Every three weeks.   Refills:  0                Prescriptions were sent or printed at these locations  (2 Prescriptions)                   Other Prescriptions                Printed at Department/Unit printer (2 of 2)         ibuprofen (ADVIL/MOTRIN) 600 MG tablet               cyclobenzaprine (FLEXERIL) 10 MG tablet                Orders Needing Specimen Collection     None      Pending Results     No orders found from 7/6/2017 to 7/9/2017.            Pending Culture Results     No orders found from 7/6/2017 to 7/9/2017.            Pending Results Instructions     If you had any lab results that were not finalized at the time of your Discharge, you can call the ED Lab Result RN at [phone]. You will be contacted by this team for any positive Lab results or changes in treatment. The nurses are available 7 days a week from 10A to 6:30P.  You can leave a message 24 hours per day and they will return your call.        Test Results From Your Hospital Stay               Clinical Quality Measure: Blood Pressure Screening     Your blood pressure was checked while you were in the emergency department today. The last reading we obtained was  BP: (!) 134/108 . Please read the guidelines below about what these numbers mean and what you should do about them.  If your systolic blood pressure (the top number) is less than 120 and your diastolic blood pressure (the bottom number) is less than 80, then your blood pressure is normal. There is nothing more that you need to do about it.  If your systolic blood pressure (the top number) is 120-139 or your diastolic blood pressure (the bottom number) is 80-89, your blood pressure may be higher than it should be. You should have your blood pressure rechecked within a year by a primary care provider.  If your systolic blood pressure (the top number) is 140 or greater or your diastolic blood pressure (the bottom number) is 90 or greater, you may have high blood pressure. High blood pressure is treatable, but if left untreated over time it can put you at risk for heart attack, stroke, or  "kidney failure. You should have your blood pressure rechecked by a primary care provider within the next 4 weeks.  If your provider in the emergency department today gave you specific instructions to follow-up with your doctor or provider even sooner than that, you should follow that instruction and not wait for up to 4 weeks for your follow-up visit.        Thank you for choosing [facility]       Thank you for choosing [facility] for your care. Our goal is always to provide you with excellent care. Hearing back from our patients is one way we can continue to improve our services. Please take a few minutes to complete the written survey that you may receive in the mail after you visit with us. Thank you!        [employer] Information     [employer] lets you send messages to your doctor, view your test results, renew your prescriptions, schedule appointments and more. To sign up, go to [email or web address] . Click on \"Log in\" on the left side of the screen, which will take you to the Welcome page. Then click on \"Sign up Now\" on the right side of the page.     You will be asked to enter the access code listed below, as well as some personal information. Please follow the directions to create your username and password.     Your access code is: [email or web address]  Expires: 10/6/2017  6:14 PM     Your access code will  in 90 days. If you need help or a new code, please call your [facility] clinic or [phone].        Care EveryWhere ID     This is your Care EveryWhere ID. This could be used by other organizations to access your [facility] medical records  [id number]        Equal Access to Services     [facility] AH: Hadii aad ku hadasho Soluciusali, waaxda luqadaha, qaybta kaalmada ademylesyawilly, anuj grubbs. So [facility] [phone].    ATENCIÓN: Si habla español, tiene a cordoba disposición servicios gratuitos de asistencia lingüística. Llame al [phone].    We comply with applicable federal civil rights " laws and [place] laws. We do not discriminate on the basis of race, color, national origin, age, disability sex, sexual orientation or gender identity.            After Visit Summary       This is your record. Keep this with you and show to your community pharmacist(s) and doctor(s) at your next visit.
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
## 2017-07-08 NOTE — DISCHARGE INSTRUCTIONS
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      Discharge Instructions  Neck Strain    You have been seen today for a neck sprain or strain.  Neck strains usually result from an injury to the neck. Car accidents, contact sports and falls are common causes of neck strain. Sometimes your neck can start to hurt because of increased activity, muscle tension, an abnormal sleeping position, or because of other problems like arthritis in the neck.     Neck pain usually comes from injured muscles and ligaments. Sometimes there is a herniated ( slipped ) disc. We don t usually do MRI scans to look for these right away, since most herniated discs will get better on their own with time. Today, we did not find any evidence that your neck pain was caused by a serious condition, such as an infection, fracture, or tumor. However, sometimes symptoms develop over time and cannot be found during an emergency visit, so it is very important that you follow up with your primary doctor.    Return to the Emergency Department if:    You have increasing pain in your neck.    You develop difficulty swallowing or breathing.    You have numbness, weakness, or trouble moving your arms or legs.    You have severe dizziness and difficulty walking.    You are unable to control your bladder or bowels.    You develop severe headache or ringing in the ears.    Call your doctor if:     Your neck pain is not controlled with the medicine we gave you.    You are not back to normal within 1 week.    What can I do to help myself at home?    If you had an injury, use cold for the first 1-2 days. Cold helps relieve pain and reduce inflammation.  Apply ice packs to the neck or areas of pain every 1-2 hours for 20 minutes at a  time. Place a towel or cloth between your skin and the ice pack.    After the first 2 days, using heat can help with neck pain and stiffness. You may use a warm shower or bath, warm towels on the neck, or a heating pad. Do not sleep with a heating pad, as you can be burned.     Pain medications - You may take a pain medication such as Tylenol  (acetaminophen), Advil , Nuprin  (ibuprofen) or Aleve  (naproxen).  If you have been given a narcotic such as Vicodin  (hydrocodone with acetaminophen), Percocet  (oxycodone with acetaminophen), codeine, or a muscle relaxant such as Flexeril  (cyclobenzaprine) or Soma  (carisoprodol), do not drive for four hours after you have taken it. If the narcotic contains Tylenol  (acetaminophen), do not take Tylenol  with it. All narcotics will cause constipation, so eat a high fiber diet.      It is usually best to rest the neck for 1-2 days after an injury, then start gentle stretching exercises.     It is helpful to place a small pillow under the nape of your neck to provide proper neutral positioning.     You should stay active and do your usual work as much as you can, unless this involves heavy physical labor. Ask your doctor if you need work restrictions.  If you were given a prescription for medicine here today, be sure to read all of the information (including the package insert) that comes with your prescription.  This will include important information about the medicine, its side effects, and any warnings that you need to know about.  The pharmacist who fills the prescription can provide more information and answer questions you may have about the medicine.  If you have questions or concerns that the pharmacist cannot address, please call or return to the Emergency Department.   Opioid Medication Information    Pain medications are among the most commonly prescribed medicines, so we are including this information for all our patients. If you did not receive pain medication  or get a prescription for pain medicine, you can ignore it.     You may have been given a prescription for an opioid (narcotic) pain medicine and/or have received a pain medicine while here in the Emergency Department. These medicines can make you drowsy or impaired. You must not drive, operate dangerous equipment, or engage in any other dangerous activities while taking these medications. If you drive while taking these medications, you could be arrested for DUI, or driving under the influence. Do not drink any alcohol while you are taking these medications.     Opioid pain medications can cause addiction. If you have a history of chemical dependency of any type, you are at a higher risk of becoming addicted to pain medications.  Only take these prescribed medications to treat your pain when all other options have been tried. Take it for as short a time and as few doses as possible. Store your pain pills in a secure place, as they are frequently stolen and provide a dangerous opportunity for children or visitors in your house to start abusing these powerful medications. We will not replace any lost or stolen medicine.  As soon as your pain is better, you should flush all your remaining medication.     Many prescription pain medications contain Tylenol  (acetaminophen), including Vicodin , Tylenol #3 , Norco , Lortab , and Percocet .  You should not take any extra pills of Tylenol  if you are using these prescription medications or you can get very sick.  Do not ever take more than 3000 mg of acetaminophen in any 24 hour period.    All opioids tend to cause constipation. Drink plenty of water and eat foods that have a lot of fiber, such as fruits, vegetables, prune juice, apple juice and high fiber cereal.  Take a laxative if you don t move your bowels at least every other day. Miralax , Milk of Magnesia, Colace , or Senna  can be used to keep you regular.      Remember that you can always come back to the Emergency  Department if you are not able to see your regular doctor in the amount of time listed above, if you get any new symptoms, or if there is anything that worries you.

## 2017-07-11 ENCOUNTER — TELEPHONE (OUTPATIENT)
Dept: OTHER | Facility: CLINIC | Age: 52
End: 2017-07-11
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
## 2017-07-11 DIAGNOSIS — I77.3 FIBROMUSCULAR DYSPLASIA (H): Primary | ICD-10-CM

## 2017-07-11 NOTE — TELEPHONE ENCOUNTER
Imaging ordered, sent for co sign as delineated below.  Sent to scheduling to coordinate scans and appointment for next week.  Neelam Figueroa RN, BSN
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
## 2017-07-12 ENCOUNTER — HOSPITAL ENCOUNTER (OUTPATIENT)
Dept: CT IMAGING | Facility: CLINIC | Age: 52
Discharge: HOME | End: 2017-07-12
Attending: INTERNAL MEDICINE | Admitting: INTERNAL MEDICINE
Payer: COMMERCIAL

## 2017-07-12 ENCOUNTER — HOSPITAL ENCOUNTER (OUTPATIENT)
Dept: CT IMAGING | Facility: CLINIC | Age: 52
End: 2017-07-12
Attending: INTERNAL MEDICINE
Payer: COMMERCIAL
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
## 2017-07-12 DIAGNOSIS — I77.3 FIBROMUSCULAR DYSPLASIA (H): ICD-10-CM

## 2017-07-12 PROCEDURE — 70450 CT HEAD/BRAIN W/O DYE: CPT | Mod: XU

## 2017-07-12 PROCEDURE — 70498 CT ANGIOGRAPHY NECK: CPT

## 2017-07-12 PROCEDURE — 25000128 H RX IP 250 OP 636: Performed by: INTERNAL MEDICINE

## 2017-07-12 RX ORDER — IOPAMIDOL 755 MG/ML
500 INJECTION, SOLUTION INTRAVASCULAR ONCE
Status: COMPLETED | OUTPATIENT
Start: 2017-07-12 | End: 2017-07-12

## 2017-07-12 RX ADMIN — SODIUM CHLORIDE 80 ML: 9 INJECTION, SOLUTION INTRAVENOUS at 13:21

## 2017-07-12 RX ADMIN — IOPAMIDOL 70 ML: 755 INJECTION, SOLUTION INTRAVENOUS at 13:21

## 2017-07-18 ENCOUNTER — OFFICE VISIT (OUTPATIENT)
Dept: OTHER | Facility: CLINIC | Age: 52
End: 2017-07-18
Attending: INTERNAL MEDICINE
Payer: COMMERCIAL

## 2017-07-18 VITALS
HEART RATE: 95 BPM | WEIGHT: 132 LBS | BODY MASS INDEX: 22.53 KG/M2 | SYSTOLIC BLOOD PRESSURE: 125 MMHG | HEIGHT: 64 IN | DIASTOLIC BLOOD PRESSURE: 72 MMHG | OXYGEN SATURATION: 99 %

## 2017-07-18 DIAGNOSIS — I77.3 FIBROMUSCULAR DYSPLASIA (H): Primary | ICD-10-CM
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
## 2017-07-18 PROCEDURE — 99211 OFF/OP EST MAY X REQ PHY/QHP: CPT

## 2017-07-18 PROCEDURE — 99214 OFFICE O/P EST MOD 30 MIN: CPT | Mod: ZP | Performed by: INTERNAL MEDICINE
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
## 2017-07-21 ENCOUNTER — TRANSFERRED RECORDS (OUTPATIENT)
Dept: HEALTH INFORMATION MANAGEMENT | Facility: CLINIC | Age: 52
End: 2017-07-21
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
## 2017-10-19 ENCOUNTER — HOSPITAL ENCOUNTER (OUTPATIENT)
Dept: MAMMOGRAPHY | Facility: CLINIC | Age: 52
Discharge: HOME | End: 2017-10-19
Attending: INTERNAL MEDICINE | Admitting: INTERNAL MEDICINE
Payer: COMMERCIAL

## 2017-10-19 DIAGNOSIS — Z12.31 ENCOUNTER FOR SCREENING MAMMOGRAM FOR HIGH-RISK PATIENT: ICD-10-CM

## 2017-10-19 PROCEDURE — G0202 SCR MAMMO BI INCL CAD: HCPCS

## 2017-10-24 ENCOUNTER — HOSPITAL ENCOUNTER (OUTPATIENT)
Dept: ULTRASOUND IMAGING | Facility: CLINIC | Age: 52
Discharge: HOME | End: 2017-10-24
Attending: INTERNAL MEDICINE | Admitting: INTERNAL MEDICINE
Payer: COMMERCIAL

## 2017-10-24 DIAGNOSIS — R92.8 ABNORMAL MAMMOGRAM: ICD-10-CM

## 2017-10-24 PROCEDURE — 76642 ULTRASOUND BREAST LIMITED: CPT | Mod: 50

## 2017-10-25 ENCOUNTER — TRANSFERRED RECORDS (OUTPATIENT)
Dept: SURGERY | Facility: CLINIC | Age: 52
End: 2017-10-25

## 2017-10-26 ENCOUNTER — HOSPITAL ENCOUNTER (OUTPATIENT)
Dept: MAMMOGRAPHY | Facility: CLINIC | Age: 52
End: 2017-10-26
Attending: INTERNAL MEDICINE
Payer: COMMERCIAL

## 2017-10-26 ENCOUNTER — HOSPITAL ENCOUNTER (OUTPATIENT)
Dept: ULTRASOUND IMAGING | Facility: CLINIC | Age: 52
Discharge: HOME | End: 2017-10-26
Attending: INTERNAL MEDICINE | Admitting: INTERNAL MEDICINE
Payer: COMMERCIAL

## 2017-10-26 ENCOUNTER — HOSPITAL ENCOUNTER (OUTPATIENT)
Dept: ULTRASOUND IMAGING | Facility: CLINIC | Age: 52
End: 2017-10-26
Attending: INTERNAL MEDICINE
Payer: COMMERCIAL

## 2017-10-26 DIAGNOSIS — R92.8 ABNORMAL MAMMOGRAM: ICD-10-CM

## 2017-10-26 PROCEDURE — 40000986 MA POST PROCEDURE RIGHT

## 2017-10-26 PROCEDURE — 27210206 US BREAST BIOPSY CORE NEEDLE RIGHT

## 2017-10-26 PROCEDURE — 40000986 MA POST PROCEDURE LEFT

## 2017-10-26 PROCEDURE — 88305 TISSUE EXAM BY PATHOLOGIST: CPT | Mod: 26,59 | Performed by: INTERNAL MEDICINE

## 2017-10-26 PROCEDURE — 88305 TISSUE EXAM BY PATHOLOGIST: CPT | Performed by: INTERNAL MEDICINE

## 2017-10-26 NOTE — DISCHARGE INSTRUCTIONS
Page 1 of 1  For informational purposes only. Not to replace the advice of your health care provider. Copyright   2010 [facility]. All rights reserved. [employer] 420834 - REV 02/16.  After Your Breast Biopsy   Bleeding or bruising  Slight bruising is normal. If you bleed through the bandage, put direct pressure on the breast for 10 minutes.   If the breast begins to swell, or you have a lot of bleeding after 10 minutes of pressure, call the doctor who ordered your exam. Or, go to the emergency room.   Bandages  Keep your bandage in place until tomorrow morning. Do not get it wet.   If you have small pieces of tape on the skin, leave them in place. They will fall off on their own, or you can remove them after 5 days.   Activity  You may shower the morning after the exam. No heavy activity (lifting, vacuuming) on the day of your exam. You may go back to normal activity the next day, unless you had a lot of bleeding or pain.  Discomfort  You may take Tylenol (acetaminophen) today for pain. Tomorrow, you may take an anti-inflammatory medicine (aspirin, ibuprofen, Motrin, Aleve, Advil), unless your doctor tells you not to.  Wear your bra overnight to support the breast. You may also use an ice pack: Place it over the area for 15-20 minutes several times a day.  Infection  Infection is rare. Symptoms include fever, redness, increasing pain and fluid draining from the biopsy site. If you have any of these symptoms, please call the doctor who ordered your exam.  Results  Results may take up to 5 business days. A nurse or doctor from the Breast Center will call with your results. We will also send the results to the doctor who ordered your biopsy.  If you have not heard your results in 5 days, please call the Breast Center.   Other instructions  ______________________________________________________________________________________________________________________________  Call your doctor if:    You have  bleeding that lasts more than 10 minutes.    You have pain that cannot be controlled.   You have signs of infection (fever, redness, drainage or other signs).   You have not received your results within 5 days.    Please call the Breast Center nurse navigator at [phone] if you have questions or concerns about your biopsy.

## 2017-10-27 ENCOUNTER — DOCUMENTATION ONLY (OUTPATIENT)
Dept: MAMMOGRAPHY | Facility: CLINIC | Age: 52
End: 2017-10-27

## 2017-10-27 LAB — COPATH REPORT: NORMAL

## 2017-10-27 NOTE — PROGRESS NOTES
Notified Dr Roach's office of available benign results.  He prefers to call the patient and will notify her by the end of the day. Request call back if she is having any concerns with biopsy site.

## 2017-10-30 ENCOUNTER — OFFICE VISIT (OUTPATIENT)
Dept: SURGERY | Facility: CLINIC | Age: 52
End: 2017-10-30
Payer: COMMERCIAL

## 2017-10-30 VITALS
TEMPERATURE: 98 F | OXYGEN SATURATION: 99 % | HEART RATE: 108 BPM | SYSTOLIC BLOOD PRESSURE: 136 MMHG | WEIGHT: 131.6 LBS | DIASTOLIC BLOOD PRESSURE: 80 MMHG | BODY MASS INDEX: 22.95 KG/M2

## 2017-10-30 DIAGNOSIS — Z85.820 HISTORY OF MALIGNANT MELANOMA: ICD-10-CM

## 2017-10-30 PROCEDURE — 99213 OFFICE O/P EST LOW 20 MIN: CPT | Performed by: SURGERY

## 2017-10-30 NOTE — PROGRESS NOTES
I was asked by Dr. Maxwell Flores to evaluate this patient for a right posterior neck mass.  Patient has an extensive history relating to melanoma with positive nodes.  She has previously been treated by Dr. Cota.  The patient reports that her primary melanoma lesion was never found.  Her initial surgery was seven years ago.  The patient has noted some tenderness in the lump in the back of her neck.  She is not sure how long its been present.    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     Carotid artery dissection (H)      Fibromuscular dysplasia (H)     renal artery     Knee pain, chronic      Migraine headache      Myofascial pain      Neck pain, chronic      Raynaud's disease      Social History     Social History     Marital status:      Spouse name: N/A     Number of children: 2     Years of education: N/A     Occupational History      [employer]     Social History Main Topics     Smoking status: Former Smoker     Types: Cigarettes     Smokeless tobacco: Never Used      Comment: quit in 2011.     Alcohol use 0.0 - 0.6 oz/week     0 - 1 Standard drinks or equivalent per week      Comment: occas     Drug use: No     Sexual activity: Not on file     Other Topics Concern     Not on file     Social History Narrative     Family History   Problem Relation Age of Onset     HEART DISEASE Paternal Grandfather      heart disease     CANCER Sister      breast     Breast Cancer Sister      CEREBROVASCULAR DISEASE Maternal Grandmother      Hypertension Maternal  Grandmother      Physical exam:  The patient is in no apparent distress.  Breathing is nonlabored.  The right posterior neck reveals a 1 cm raised lesion.  There is no obvious fixation to underlying tissues.  There may be a very tiny punctum present.  This is mildly tender to palpation.    Assessment and plan: This is a patient with a history of metastatic melanoma who now presents with a lump on her right posterior neck.  This looks most consistent with a benign cyst.  I recommended excision in order to obtain a definitive diagnosis.  In addition, this has recently been causing her some discomfort, and removing it for symptomatic relief is also appropriate.  I recommended excision under local anesthetic.  We discussed the procedure, along with its risks and complications, and detail.  We will work on getting this scheduled either with me or with Dr. Iris Cota in the near future.    Alhaji Mccarthy MD  [facility]    Please route or send letter to:  Primary Care Provider (PCP)

## 2017-10-30 NOTE — NURSING NOTE
"Chief Complaint   Patient presents with     Consult     Cyst on R upper back        Initial /80  Pulse 108  Temp 98  F (36.7  C) (Oral)  Wt 131 lb 9.6 oz (59.7 kg)  SpO2 99%  BMI 22.95 kg/m2 Estimated body mass index is 22.95 kg/(m^2) as calculated from the following:    Height as of 7/18/17: 5' 3.5\" (1.613 m).    Weight as of this encounter: 131 lb 9.6 oz (59.7 kg).  Medication Reconciliation: complete   Yuli Triplett, [place]      "

## 2017-10-30 NOTE — LETTER
2017    Re: Razia Almeida : 1965    I was asked by Dr. Maxwell Flores to evaluate this patient for a right posterior neck mass.  Patient has an extensive history relating to melanoma with positive nodes.  She has previously been treated by Dr. Cota.  The patient reports that her primary melanoma lesion was never found.  Her initial surgery was seven years ago.  The patient has noted some tenderness in the lump in the back of her neck.  She is not sure how long its been present.     Physical exam:  The patient is in no apparent distress.  Breathing is nonlabored.  The right posterior neck reveals a 1 cm raised lesion.  There is no obvious fixation to underlying tissues.  There may be a very tiny punctum present.  This is mildly tender to palpation.     Assessment and plan: This is a patient with a history of metastatic melanoma who now presents with a lump on her right posterior neck.  This looks most consistent with a benign cyst.  I recommended excision in order to obtain a definitive diagnosis.  In addition, this has recently been causing her some discomfort, and removing it for symptomatic relief is also appropriate.  I recommended excision under local anesthetic.  We discussed the procedure, along with its risks and complications, and detail.  We will work on getting this scheduled either with me or with Dr. Iris Cota in the near future.     Alhaji Mccarthy MD

## 2017-10-30 NOTE — MR AVS SNAPSHOT
"              After Visit Summary   10/30/2017    Razia Almeida    MRN: [id number]           Patient Information     Date Of Birth          1965        Visit Information        Provider Department      10/30/2017 2:00 PM Alhaji Mccarthy MD [facility] General Surgery      Today's Diagnoses     Cyst of neck    -  1    History of malignant melanoma           Follow-ups after your visit        Who to contact     If you have questions or need follow up information about today's clinic visit or your schedule please contact [facility] directly at [phone].  Normal or non-critical lab and imaging results will be communicated to you by [employer], letter or phone within 4 business days after the clinic has received the results. If you do not hear from us within 7 days, please contact the clinic through [employer] or phone. If you have a critical or abnormal lab result, we will notify you by phone as soon as possible.  Submit refill requests through [employer] or call your pharmacy and they will forward the refill request to us. Please allow 3 business days for your refill to be completed.          Additional Information About Your Visit        MyChart Information     [employer] lets you send messages to your doctor, view your test results, renew your prescriptions, schedule appointments and more. To sign up, go to [email or web address] . Click on \"Log in\" on the left side of the screen, which will take you to the Welcome page. Then click on \"Sign up Now\" on the right side of the page.     You will be asked to enter the access code listed below, as well as some personal information. Please follow the directions to create your username and password.     Your access code is: [email or web address]  Expires: 2018  2:10 PM     Your access code will  in 90 days. If you need help or a new code, please call your [place] clinic or [phone].        Care " EveryWhere ID     This is your Care EveryWhere ID. This could be used by other organizations to access your [place] medical records  [id number]        Your Vitals Were     Pulse Temperature Pulse Oximetry BMI (Body Mass Index)          108 98  F (36.7  C) (Oral) 99% 22.95 kg/m2         Blood Pressure from Last 3 Encounters:   10/30/17 136/80   07/18/17 125/72   07/08/17 (!) 130/97    Weight from Last 3 Encounters:   10/30/17 131 lb 9.6 oz (59.7 kg)   07/18/17 132 lb (59.9 kg)   07/08/17 128 lb (58.1 kg)              Today, you had the following     No orders found for display         Today's Medication Changes          These changes are accurate as of: 10/30/17  2:10 PM.  If you have any questions, ask your nurse or doctor.               These medicines have changed or have updated prescriptions.        Dose/Directions    atorvastatin 10 MG tablet   Commonly known as:  LIPITOR   This may have changed:  when to take this   Used for:  Carotid artery dissection (H)        Dose:  10 mg   Take 1 tablet (10 mg) by mouth daily   Quantity:  90 tablet   Refills:  3                Primary Care Provider Office Phone # Fax #    Phong Jarvis [phone]       [facility] [id number] [place] [address]        Equal Access to Services     ALIZE ZHONG AH: Alexandra Santos, waaxda luqadaha, qaybta kaalmada bhaskar, anuj grubbs. So [facility] [phone].    ATENCIÓN: Si habla español, tiene a cordoba disposición servicios gratuitos de asistencia lingüística. Kerry titus [phone].    We comply with applicable federal civil rights laws and [place] laws. We do not discriminate on the basis of race, color, national origin, age, disability, sex, sexual orientation, or gender identity.            Thank you!     Thank you for choosing [facility]  for your care. Our goal is always to provide you with excellent care. Hearing back from  our patients is one way we can continue to improve our services. Please take a few minutes to complete the written survey that you may receive in the mail after your visit with us. Thank you!             Your Updated Medication List - Protect others around you: Learn how to safely use, store and throw away your medicines at www.disposemymeds.org.          This list is accurate as of: 10/30/17  2:10 PM.  Always use your most recent med list.                   Brand Name Dispense Instructions for use Diagnosis    ACYCLOVIR PO      Take 800 mg by mouth 5 times daily PRN        AMBIEN PO      Take 5 mg by mouth At Bedtime        aspirin 81 MG EC tablet     90 tablet    Take 1 tablet (81 mg) by mouth daily    Fibromuscular dysplasia (H)       ATIVAN PO      Take 0.5 mg by mouth daily as needed        atorvastatin 10 MG tablet    LIPITOR    90 tablet    Take 1 tablet (10 mg) by mouth daily    Carotid artery dissection (H)       calcium-vitamin D 600-400 MG-UNIT per tablet    CALTRATE     Take 1 tablet by mouth daily        DOXAZOSIN MESYLATE PO      Take 4 mg by mouth daily        ESTRACE PO      Take 1 mg by mouth every evening        GLUCOSAMINE 1500 COMPLEX Caps      Take 3,000 mg by mouth every evening        HYDROcodone-acetaminophen 5-325 MG per tablet    NORCO    12 tablet    Take 1-2 tablets by mouth every 6 hours as needed for moderate to severe pain        hydrocortisone 2.5 % cream    ANUSOL-HC     Place rectally daily as needed        ibuprofen 600 MG tablet    ADVIL/MOTRIN    30 tablet    Take 1 tablet (600 mg) by mouth every 6 hours as needed for moderate pain        IMITREX PO      Take 100 mg by mouth once        nicotine 21 MG/24HR 24 hr patch    NICODERM CQ     Place 1 patch onto the skin daily as needed        PROBIOTIC DAILY PO      Take 1 capsule by mouth every evening        RANITIDINE HCL PO      Take 75 mg by mouth daily        YERVOY IV      Every three weeks.

## 2017-11-02 ENCOUNTER — HOSPITAL ENCOUNTER (OUTPATIENT)
Facility: CLINIC | Age: 52
Discharge: HOME | End: 2017-11-02
Attending: SURGERY | Admitting: SURGERY
Payer: COMMERCIAL

## 2017-11-02 ENCOUNTER — APPOINTMENT (OUTPATIENT)
Dept: SURGERY | Facility: PHYSICIAN GROUP | Age: 52
End: 2017-11-02
Payer: COMMERCIAL

## 2017-11-02 ENCOUNTER — SURGERY (OUTPATIENT)
Age: 52
End: 2017-11-02

## 2017-11-02 VITALS
SYSTOLIC BLOOD PRESSURE: 130 MMHG | HEIGHT: 64 IN | RESPIRATION RATE: 15 BRPM | OXYGEN SATURATION: 100 % | WEIGHT: 128 LBS | BODY MASS INDEX: 21.85 KG/M2 | DIASTOLIC BLOOD PRESSURE: 94 MMHG

## 2017-11-02 PROCEDURE — 88305 TISSUE EXAM BY PATHOLOGIST: CPT | Mod: 26 | Performed by: SURGERY

## 2017-11-02 PROCEDURE — 25000125 ZZHC RX 250: Performed by: SURGERY

## 2017-11-02 PROCEDURE — S0020 INJECTION, BUPIVICAINE HYDRO: HCPCS | Performed by: SURGERY

## 2017-11-02 PROCEDURE — 36000063 ZZH SURGERY LEVEL 4 EA 15 ADDTL MIN: Performed by: SURGERY

## 2017-11-02 PROCEDURE — 88305 TISSUE EXAM BY PATHOLOGIST: CPT | Performed by: SURGERY

## 2017-11-02 PROCEDURE — 71000027 ZZH RECOVERY PHASE 2 EACH 15 MINS: Performed by: SURGERY

## 2017-11-02 PROCEDURE — 11422 EXC H-F-NK-SP B9+MARG 1.1-2: CPT | Performed by: SURGERY

## 2017-11-02 PROCEDURE — 40000306 ZZH STATISTIC PRE PROC ASSESS II: Performed by: SURGERY

## 2017-11-02 PROCEDURE — 36000093 ZZH SURGERY LEVEL 4 1ST 30 MIN: Performed by: SURGERY

## 2017-11-02 PROCEDURE — 27210794 ZZH OR GENERAL SUPPLY STERILE: Performed by: SURGERY

## 2017-11-02 RX ORDER — IBUPROFEN 600 MG/1
600 TABLET, FILM COATED ORAL
Status: DISCONTINUED | OUTPATIENT
Start: 2017-11-02 | End: 2017-11-02 | Stop reason: HOSPADM

## 2017-11-02 RX ADMIN — BUPIVACAINE HYDROCHLORIDE 3 ML: 2.5 INJECTION, SOLUTION INFILTRATION; PERINEURAL at 10:39

## 2017-11-02 NOTE — IP AVS SNAPSHOT
[facility] PreOP/PostOP    [address]    [facility] [address]    Phone:  [phone]    Fax:  [phone]                                       After Visit Summary   11/2/2017    Razia Almeida    MRN: [id number]           After Visit Summary Signature Page     I have received my discharge instructions, and my questions have been answered. I have discussed any challenges I see with this plan with the nurse or doctor.    ..........................................................................................................................................  Patient/Patient Representative Signature      ..........................................................................................................................................  Patient Representative Print Name and Relationship to Patient    ..................................................               ................................................  Date                                            Time    ..........................................................................................................................................  Reviewed by Signature/Title    ...................................................              ..............................................  Date                                                            Time

## 2017-11-02 NOTE — DISCHARGE INSTRUCTIONS
HOME CARE FOLLOWING MINOR SURGERY  KARIE Haro E. Gavin, N. Guttormson, D. Maurer, JOSELINE Rosales    RESULTS:  If a biopsy of tissue was done, you may call for your final pathology report after 1p.m. two working days after surgery.  Your results will also be reviewed with you at your postoperative appointment.    INCISIONAL CARE:    If you have a dressing in place, keep clean and dry for 48 hours; you may replace the gauze if it becomes soiled.    After 48 hours you may remove the dressing and shower.  Do not submerse incision in water for 1 week.    If you have a Dermabond dressing (a type of skin glue), you may shower immediately.    Sutures which are beneath the skin will absorb and do not need to be removed.    Sutures you can see should be removed at your surgeon's office in 10-14 days at the latest.    If present, leave the steri-strips (white paper tapes) in place for 14 days after surgery.    If present, leave Dermabond glue in place until it wears/flakes off.    You may expect a small amount of drainage from your incision.    A lump/ridge under the incision is normal and will gradually resolve.  If it becomes red or very uncomfortable, contact the nurse at your surgeon's office to discuss whether this needs to be evaluated.    ACTIVITY:  Cautiously resume exercise and strenuous activities such as jogging, tennis, aerobics, etc. Also, be careful of stretching activities which affect the area of surgery for two weeks.    DIET:  No restrictions.  Increased fluid intake is recommended. While taking pain medications, increase dietary fiber or add a fiber supplementation like Metamucil or Citrucel to help prevent constipation - a possible side effect of pain medications.    DISCOMFORT:  Local anesthetic placed at surgery should provide relief for 4-8 hours.  Begin taking pain pills before discomfort is severe.  Take the pain medication with some food, when possible, to minimize side  effects.  Intermittent use of ice packs may help during the first 48 hours.  Expect gradual improvement.  You may slowly transition to use of over-the-counter medications for pain relief when you are no longer requiring narcotics for pain control.    RETURN APPOINTMENT:  Schedule a follow-up visit approximately one week post-op.  Office Phone:  [phone]     CONTACT US IF THE FOLLOWING DEVELOPS:   1. A fever that is above 101     2. If there is a large amount of drainage, bleeding, or swelling.   3. Severe pain that is not relieved by your prescription.   4. Drainage that is thick, cloudy, yellow, green or white.   5. Any other questions not answered by  Frequently Asked Questions  sheet.      FREQUENTLY ASKED QUESTIONS:    Q:  How should my incision look?    A:  Normally your incision will appear slightly swollen with light redness directly along the incision itself as it heals.  It may feel like a bump or ridge as the healing/scarring happens, and over time (3-4 months) this bump or ridge feeling should slowly go away.  In general, clear or pink watery drainage can be normal at first as your incision heals, but should decrease over time.    Q:  How do I know if my incision is infected?  A:  Look at your incision for signs of infection, like redness around the incision spreading to surrounding skin, or drainage of cloudy or foul-smelling drainage.  If you feel warm, check your temperature to see if you are running a fever.    **If any of these things occur, please notify the nurse at our office.  We may need you to come into the office for an incision check.      Q:  How do I take care of my incision?  A:  If you have a dressing in place - Starting the day after surgery, replace the dressing 1-2 times a day until there is no further drainage from the incision.  At that time, a dressing is no longer needed.  Try to minimize tape on the skin if irritation is occurring at the tape sites.  If you have significant  irritation from tape on the skin, please call the office to discuss other method of dressing your incision.    Small pieces of tape called  steri-strips  may be present directly overlying your incision; these may be removed 10 days after surgery unless otherwise specified by your surgeon.  If these tapes start to loosen at the ends, you may trim them back until they fall off or are removed.    A:  If you had  Dermabond  tissue glue used as a dressing (this causes your incision to look shiny with a clear covering over it) - This type of dressing wears off with time and does not require more dressings over the top unless it is draining around the glue as it wears off.  Do not apply ointments or lotions over the incisions until the glue has completely worn off.    Q:  There is a piece of tape or a sticky  lead  still on my skin.  Can I remove this?  A:  Sometimes the sticky  leads  used for monitoring during surgery or for evaluation in the emergency department are not all removed while you are in the hospital.  These sometimes have a tab or metal dot on them.  You can easily remove these on your own, like taking off a band-aid.  If there is a gel substance under the  lead , simply wipe/clean it off with a washcloth or paper towel.      Q:  What can I do to minimize constipation (very hard stools, or lack of stools)?  A:  Stay well hydrated.  Increase your dietary fiber intake or take a fiber supplement -with plenty of water.  Walk around frequently.  You may consider an over-the-counter stool-softener.  Your Pharmacist can assist you with choosing one that is stocked at your pharmacy.  Constipation is also one of the most common side effects of pain medication.  If you are using pain medication, be pro-active and try to PREVENT problems with constipation by taking the steps above BEFORE constipation becomes a problem.    Q:  What do I do if I need more pain medications?  A:  Call the office to receive refills.  Be  aware that certain pain meds cannot be called into a pharmacy and actually require a paper prescription.  A change may be made in your pain med as you progress thru your recovery period or if you have side effects to certain meds.    --Pain meds are NOT refilled after 5pm on weekdays, and NOT AT ALL on the weekends, so please look ahead to prevent problems.      Q:  Why am I having a hard time sleeping now that I am at home?  A:  Many medications you receive while you are in the hospital can impact your sleep for a number of days after your surgery/hospitalization.  Decreased level of activity and naps during the day may also make sleeping at night difficult.  Try to minimize day-time naps, and get up frequently during the day to walk around your home during your recovery time.  Sleep aides may be of some help, but are not recommended for long-term use.      Q:  I am having some back discomfort.  What should I do?  A:  This may be related to certain positioning that was required for your surgery, extended periods of time in bed, or other changes in your overall activity level.  You may try ice, heat, acetaminophen, or ibuprofen to treat this temporarily.  Note that many pain medications have acetaminophen in them and would state this on the prescription bottle.  Be sure not to exceed the maximum of 4000mg per day of acetaminophen.     **If the pain you are having does not resolve, is severe, or is a flare of back pain you have had on other occasions prior to surgery, please contact your primary physician for further recommendations or for an appointment to be examined at their office.    Q:  Why am I having headaches?  A:  Headaches can be caused by many things:  caffeine withdrawal, use of pain meds, dehydration, high blood pressure, lack of sleep, over-activity/exhaustion, flare-up of usual migraine headaches.  If you feel this is related to muscle tension (a band-like feeling around the head, or a pressure at the  low-back of the head) you may try ice or heat to this area.  You may need to drink more fluids (try electrolyte drink like Gatorade), rest, or take your usual migraine medications.   **If your headaches do not resolve, worsen, are accompanied by other symptoms, or if your blood pressure is high, please call your primary physician for recommendation and/or examination.    Q:  I am unable to urinate.  What do I do?  A:  A small percentage of people can have difficulty urinating initially after surgery.  This includes being able to urinate only a very small amount at a time and feeling discomfort or pressure in the very low abdomen.  This is called  urinary retention , and is actually an urgent situation.  Proceed to your nearest Emergency department for evaluation (not an Urgent Care Center).  Sometimes the bladder does not work correctly after certain medications you receive during surgery, or related to certain procedures.  You may need to have a catheter placed until your bladder recovers.  When planning to go to an Emergency department, it may help to call the ER to let them know you are coming in for this problem after a surgery.  This may help you get in quicker to be evaluated.  **If you have symptoms of a urinary tract infection, please contact your primary physician for the proper evaluation and treatment.          If you have other questions, please call the office Monday thru Friday between 8am and 5pm to discuss with the nurse or physician assistant.  #[phone]    There is a surgeon ON CALL on weekday evenings and over the weekend in case of urgent need only, and may be contacted at the same number.    If you are having an emergency, call 911 or proceed to your nearest emergency department.

## 2017-11-02 NOTE — IP AVS SNAPSHOT
MRN:[id number]                      After Visit Summary   11/2/2017    Razia Almeida    MRN: [id number]           Thank you!     Thank you for choosing [facility] for your care. Our goal is always to provide you with excellent care. Hearing back from our patients is one way we can continue to improve our services. Please take a few minutes to complete the written survey that you may receive in the mail after you visit. If you would like to speak to someone directly about your visit please contact Patient Relations at [phone]. Thank you!          Patient Information     Date Of Birth          1965        About your hospital stay     You were admitted on:  November 2, 2017 You last received care in the:  [facility] PreOP/PostOP    You were discharged on:  November 2, 2017       Who to Call     For medical emergencies, please call 911.  For non-urgent questions about your medical care, please call your primary care provider or clinic, [phone]  For questions related to your surgery, please call your surgery clinic        Attending Provider     Provider Specialty    Alhaji Mccarthy MD General Surgery       Primary Care Provider Office Phone # Fax #    Phong Jarvis [phone]      Further instructions from your care team       HOME CARE FOLLOWING MINOR SURGERY  KARIE Haro E. Gavin, N. Guttormson, D. Maurer, RAYNA Simmons, JOSELINE Darling    RESULTS:  If a biopsy of tissue was done, you may call for your final pathology report after 1p.m. two working days after surgery.  Your results will also be reviewed with you at your postoperative appointment.    INCISIONAL CARE:    If you have a dressing in place, keep clean and dry for 48 hours; you may replace the gauze if it becomes soiled.    After 48 hours you may remove the dressing and shower.  Do not submerse incision in water for 1 week.    If you have a Dermabond dressing (a type  of skin glue), you may shower immediately.    Sutures which are beneath the skin will absorb and do not need to be removed.    Sutures you can see should be removed at your surgeon's office in 10-14 days at the latest.    If present, leave the steri-strips (white paper tapes) in place for 14 days after surgery.    If present, leave Dermabond glue in place until it wears/flakes off.    You may expect a small amount of drainage from your incision.    A lump/ridge under the incision is normal and will gradually resolve.  If it becomes red or very uncomfortable, contact the nurse at your surgeon's office to discuss whether this needs to be evaluated.    ACTIVITY:  Cautiously resume exercise and strenuous activities such as jogging, tennis, aerobics, etc. Also, be careful of stretching activities which affect the area of surgery for two weeks.    DIET:  No restrictions.  Increased fluid intake is recommended. While taking pain medications, increase dietary fiber or add a fiber supplementation like Metamucil or Citrucel to help prevent constipation - a possible side effect of pain medications.    DISCOMFORT:  Local anesthetic placed at surgery should provide relief for 4-8 hours.  Begin taking pain pills before discomfort is severe.  Take the pain medication with some food, when possible, to minimize side effects.  Intermittent use of ice packs may help during the first 48 hours.  Expect gradual improvement.  You may slowly transition to use of over-the-counter medications for pain relief when you are no longer requiring narcotics for pain control.    RETURN APPOINTMENT:  Schedule a follow-up visit approximately one week post-op.  Office Phone:  [phone]     CONTACT US IF THE FOLLOWING DEVELOPS:   1. A fever that is above 101     2. If there is a large amount of drainage, bleeding, or swelling.   3. Severe pain that is not relieved by your prescription.   4. Drainage that is thick, cloudy, yellow, green or  white.   5. Any other questions not answered by  Frequently Asked Questions  sheet.      FREQUENTLY ASKED QUESTIONS:    Q:  How should my incision look?    A:  Normally your incision will appear slightly swollen with light redness directly along the incision itself as it heals.  It may feel like a bump or ridge as the healing/scarring happens, and over time (3-4 months) this bump or ridge feeling should slowly go away.  In general, clear or pink watery drainage can be normal at first as your incision heals, but should decrease over time.    Q:  How do I know if my incision is infected?  A:  Look at your incision for signs of infection, like redness around the incision spreading to surrounding skin, or drainage of cloudy or foul-smelling drainage.  If you feel warm, check your temperature to see if you are running a fever.    **If any of these things occur, please notify the nurse at our office.  We may need you to come into the office for an incision check.      Q:  How do I take care of my incision?  A:  If you have a dressing in place - Starting the day after surgery, replace the dressing 1-2 times a day until there is no further drainage from the incision.  At that time, a dressing is no longer needed.  Try to minimize tape on the skin if irritation is occurring at the tape sites.  If you have significant irritation from tape on the skin, please call the office to discuss other method of dressing your incision.    Small pieces of tape called  steri-strips  may be present directly overlying your incision; these may be removed 10 days after surgery unless otherwise specified by your surgeon.  If these tapes start to loosen at the ends, you may trim them back until they fall off or are removed.    A:  If you had  Dermabond  tissue glue used as a dressing (this causes your incision to look shiny with a clear covering over it) - This type of dressing wears off with time and does not require more dressings over the top  unless it is draining around the glue as it wears off.  Do not apply ointments or lotions over the incisions until the glue has completely worn off.    Q:  There is a piece of tape or a sticky  lead  still on my skin.  Can I remove this?  A:  Sometimes the sticky  leads  used for monitoring during surgery or for evaluation in the emergency department are not all removed while you are in the hospital.  These sometimes have a tab or metal dot on them.  You can easily remove these on your own, like taking off a band-aid.  If there is a gel substance under the  lead , simply wipe/clean it off with a washcloth or paper towel.      Q:  What can I do to minimize constipation (very hard stools, or lack of stools)?  A:  Stay well hydrated.  Increase your dietary fiber intake or take a fiber supplement -with plenty of water.  Walk around frequently.  You may consider an over-the-counter stool-softener.  Your Pharmacist can assist you with choosing one that is stocked at your pharmacy.  Constipation is also one of the most common side effects of pain medication.  If you are using pain medication, be pro-active and try to PREVENT problems with constipation by taking the steps above BEFORE constipation becomes a problem.    Q:  What do I do if I need more pain medications?  A:  Call the office to receive refills.  Be aware that certain pain meds cannot be called into a pharmacy and actually require a paper prescription.  A change may be made in your pain med as you progress thru your recovery period or if you have side effects to certain meds.    --Pain meds are NOT refilled after 5pm on weekdays, and NOT AT ALL on the weekends, so please look ahead to prevent problems.      Q:  Why am I having a hard time sleeping now that I am at home?  A:  Many medications you receive while you are in the hospital can impact your sleep for a number of days after your surgery/hospitalization.  Decreased level of activity and naps during the  day may also make sleeping at night difficult.  Try to minimize day-time naps, and get up frequently during the day to walk around your home during your recovery time.  Sleep aides may be of some help, but are not recommended for long-term use.      Q:  I am having some back discomfort.  What should I do?  A:  This may be related to certain positioning that was required for your surgery, extended periods of time in bed, or other changes in your overall activity level.  You may try ice, heat, acetaminophen, or ibuprofen to treat this temporarily.  Note that many pain medications have acetaminophen in them and would state this on the prescription bottle.  Be sure not to exceed the maximum of 4000mg per day of acetaminophen.     **If the pain you are having does not resolve, is severe, or is a flare of back pain you have had on other occasions prior to surgery, please contact your primary physician for further recommendations or for an appointment to be examined at their office.    Q:  Why am I having headaches?  A:  Headaches can be caused by many things:  caffeine withdrawal, use of pain meds, dehydration, high blood pressure, lack of sleep, over-activity/exhaustion, flare-up of usual migraine headaches.  If you feel this is related to muscle tension (a band-like feeling around the head, or a pressure at the low-back of the head) you may try ice or heat to this area.  You may need to drink more fluids (try electrolyte drink like Gatorade), rest, or take your usual migraine medications.   **If your headaches do not resolve, worsen, are accompanied by other symptoms, or if your blood pressure is high, please call your primary physician for recommendation and/or examination.    Q:  I am unable to urinate.  What do I do?  A:  A small percentage of people can have difficulty urinating initially after surgery.  This includes being able to urinate only a very small amount at a time and feeling discomfort or pressure in the  "very low abdomen.  This is called  urinary retention , and is actually an urgent situation.  Proceed to your nearest Emergency department for evaluation (not an Urgent Care Center).  Sometimes the bladder does not work correctly after certain medications you receive during surgery, or related to certain procedures.  You may need to have a catheter placed until your bladder recovers.  When planning to go to an Emergency department, it may help to call the ER to let them know you are coming in for this problem after a surgery.  This may help you get in quicker to be evaluated.  **If you have symptoms of a urinary tract infection, please contact your primary physician for the proper evaluation and treatment.          If you have other questions, please call the office Monday thru Friday between 8am and 5pm to discuss with the nurse or physician assistant.  #[phone]    There is a surgeon ON CALL on weekday evenings and over the weekend in case of urgent need only, and may be contacted at the same number.    If you are having an emergency, call 911 or proceed to your nearest emergency department.            Pending Results     Date and Time Order Name Status Description    11/2/2017 1038 Surgical pathology exam In process             Admission Information     Date & Time Provider Department Dept. Phone    11/2/2017 Alhaji Mccarthy MD [facility] PreOP/PostOP [phone]      Your Vitals Were     Blood Pressure Respirations Height Weight Pulse Oximetry BMI (Body Mass Index)    130/94 15 1.613 m (5' 3.5\") 58.1 kg (128 lb) 100% 22.32 kg/m2      [facility] Information     [employer] lets you send messages to your doctor, view your test results, renew your prescriptions, schedule appointments and more. To sign up, go to [email or web address] . Click on \"Log in\" on the left side of the screen, which will take you to the Welcome page. Then click on \"Sign up Now\" on the right side of the page.     You will be asked " to enter the access code listed below, as well as some personal information. Please follow the directions to create your username and password.     Your access code is: [email or web address]  Expires: 2018  2:10 PM     Your access code will  in 90 days. If you need help or a new code, please call your [facility] clinic or [phone].        Care EveryWhere ID     This is your Care EveryWhere ID. This could be used by other organizations to access your [facility] medical records  [id number]        Equal Access to Services     [facility]: Hadii justo alvarez hadhillary Sozhanna, waaxda luqadaha, qaybdenise kaalmawilly muro, anuj white . So [facility] [phone].    ATENCIÓN: Si habla español, tiene a cordoba disposición servicios gratuitos de asistencia lingüística. Llame al [phone].    We comply with applicable federal civil rights laws and [place] laws. We do not discriminate on the basis of race, color, national origin, age, disability, sex, sexual orientation, or gender identity.               Review of your medicines      CONTINUE these medicines which have NOT CHANGED        Dose / Directions    ACYCLOVIR PO        Dose:  800 mg   Take 800 mg by mouth 5 times daily PRN   Refills:  0       AMBIEN PO        Dose:  10 mg   Take 10 mg by mouth At Bedtime   Refills:  0       aspirin 81 MG EC tablet   Used for:  Fibromuscular dysplasia (H)        Dose:  81 mg   Take 1 tablet (81 mg) by mouth daily   Quantity:  90 tablet   Refills:  3       ATIVAN PO        Dose:  0.5 mg   Take 0.5 mg by mouth daily as needed   Refills:  0       ATORVASTATIN CALCIUM PO        Dose:  10 mg   Take 10 mg by mouth every evening   Refills:  0       calcium-vitamin D 600-400 MG-UNIT per tablet   Commonly known as:  CALTRATE        Dose:  1 tablet   Take 1 tablet by mouth daily   Refills:  0       DOXAZOSIN MESYLATE PO        Dose:  4 mg   Take 4 mg by mouth daily   Refills:  0       ESTRACE PO        Dose:  1 mg   Take 1  mg by mouth every evening   Refills:  0       GLUCOSAMINE 1500 COMPLEX Caps        Dose:  3000 mg   Take 3,000 mg by mouth every evening   Refills:  0       HYDROcodone-acetaminophen 5-325 MG per tablet   Commonly known as:  NORCO        Dose:  1-2 tablet   Take 1-2 tablets by mouth every 6 hours as needed for moderate to severe pain   Quantity:  12 tablet   Refills:  0       hydrocortisone 2.5 % cream   Commonly known as:  ANUSOL-HC        Place rectally daily as needed   Refills:  0       ibuprofen 600 MG tablet   Commonly known as:  ADVIL/MOTRIN        Dose:  600 mg   Take 1 tablet (600 mg) by mouth every 6 hours as needed for moderate pain   Quantity:  30 tablet   Refills:  1       IMITREX PO        Dose:  100 mg   Take 100 mg by mouth every 8 hours as needed   Refills:  0       nicotine 21 MG/24HR 24 hr patch   Commonly known as:  NICODERM CQ        Dose:  1 patch   Place 1 patch onto the skin daily as needed   Refills:  0       PROBIOTIC DAILY PO        Dose:  1 capsule   Take 1 capsule by mouth every evening   Refills:  0       RANITIDINE HCL PO        Dose:  75 mg   Take 75 mg by mouth daily as needed   Refills:  0       YERVOY IV        Every three months.   Refills:  0                Protect others around you: Learn how to safely use, store and throw away your medicines at www.disposemymeds.org.             Medication List: This is a list of all your medications and when to take them. Check marks below indicate your daily home schedule. Keep this list as a reference.      Medications           Morning Afternoon Evening Bedtime As Needed    ACYCLOVIR PO   Take 800 mg by mouth 5 times daily PRN                                AMBIEN PO   Take 10 mg by mouth At Bedtime                                aspirin 81 MG EC tablet   Take 1 tablet (81 mg) by mouth daily                                ATIVAN PO   Take 0.5 mg by mouth daily as needed                                ATORVASTATIN CALCIUM PO   Take 10 mg by  mouth every evening                                calcium-vitamin D 600-400 MG-UNIT per tablet   Commonly known as:  CALTRATE   Take 1 tablet by mouth daily                                DOXAZOSIN MESYLATE PO   Take 4 mg by mouth daily                                ESTRACE PO   Take 1 mg by mouth every evening                                GLUCOSAMINE 1500 COMPLEX Caps   Take 3,000 mg by mouth every evening                                HYDROcodone-acetaminophen 5-325 MG per tablet   Commonly known as:  NORCO   Take 1-2 tablets by mouth every 6 hours as needed for moderate to severe pain                                hydrocortisone 2.5 % cream   Commonly known as:  ANUSOL-HC   Place rectally daily as needed                                ibuprofen 600 MG tablet   Commonly known as:  ADVIL/MOTRIN   Take 1 tablet (600 mg) by mouth every 6 hours as needed for moderate pain                                IMITREX PO   Take 100 mg by mouth every 8 hours as needed                                nicotine 21 MG/24HR 24 hr patch   Commonly known as:  NICODERM CQ   Place 1 patch onto the skin daily as needed                                PROBIOTIC DAILY PO   Take 1 capsule by mouth every evening                                RANITIDINE HCL PO   Take 75 mg by mouth daily as needed                                ALBINO IV   Every three months.

## 2017-11-02 NOTE — OP NOTE
General Surgery Operative Note    Pre-operative diagnosis:  Right posterior Neck Mass   Post-operative diagnosis: same   Procedure:  excision of right posterior neck mass, 1.5 cm.     Surgeon: Alhaji Mccarthy MD   Assistant(s): Dana Graff PA-C   Anesthesia: Local    Estimated blood loss: 1 cc's   Drains placed: None   Complications:  None   Findings:   1.5 cm cystic lesion with keratinous material and some thick fluid with a purulent appearance.       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  She has developed a lump on her right posterior neck.  This appears most consistent with a cyst, but there is some concern it could be melanoma.  Excision was recommended, and the procedure, along with its risks and complications, was discussed with the patient.  She agreed to proceed.  The patient notes that a PET scan done since her clinic visit revealed that this area did enhance on PET scan.    Details of the operation: After informed consent, the patient was taken to the operating room where she was sterilely prepped and draped.  The area surrounding the right posterior neck lesion was infiltrated with a one-to-one mixture of 1% lidocaine with epinephrine and 0.5% Marcaine.  An elliptical incision was made around the lesion and dissection was carried down until the wall of the lesion was encountered.  There was some thick white fluid within the lesion suggestive of purulent fluid.  There is no obvious cellulitic change to suggest bacterial infection, however.  Electrocautery and sharp dissection were now used to dissected the entire cyst wall out from surrounding tissue.  The incision was loosely closed with a single 3-0 Vicryl suture and a Steri-Strip.    The patient tolerated the procedure well and was transferred to the recovery room in satisfactory condition.  Sponge and needle counts were correct at the close of the case.    Specimens:   ID  Type Source Tests Collected by Time Destination   A : Right posterior neck mass Tissue Neck SURGICAL PATHOLOGY EXAM Alhaji Mccarthy MD 11/2/2017 10:37 AM            Alhaji Mccarthy MD

## 2017-11-03 ENCOUNTER — TRANSFERRED RECORDS (OUTPATIENT)
Dept: HEALTH INFORMATION MANAGEMENT | Facility: CLINIC | Age: 52
End: 2017-11-03

## 2017-11-03 LAB — COPATH REPORT: NORMAL

## 2017-11-09 ENCOUNTER — HOSPITAL ENCOUNTER (OUTPATIENT)
Dept: ULTRASOUND IMAGING | Facility: CLINIC | Age: 52
Discharge: HOME | End: 2017-11-09
Attending: INTERNAL MEDICINE | Admitting: INTERNAL MEDICINE
Payer: COMMERCIAL

## 2017-11-09 DIAGNOSIS — R22.2 SUBCUTANEOUS MASS OF SUPRACLAVICULAR AREA: ICD-10-CM

## 2017-11-09 PROCEDURE — 88305 TISSUE EXAM BY PATHOLOGIST: CPT | Performed by: INTERNAL MEDICINE

## 2017-11-09 PROCEDURE — 88305 TISSUE EXAM BY PATHOLOGIST: CPT | Mod: 26 | Performed by: INTERNAL MEDICINE

## 2017-11-09 PROCEDURE — 25000125 ZZHC RX 250: Performed by: INTERNAL MEDICINE

## 2017-11-09 PROCEDURE — 76942 ECHO GUIDE FOR BIOPSY: CPT

## 2017-11-09 RX ADMIN — LIDOCAINE HYDROCHLORIDE 10 ML: 10 INJECTION, SOLUTION EPIDURAL; INFILTRATION; INTRACAUDAL; PERINEURAL at 13:54

## 2017-11-09 NOTE — PROGRESS NOTES
Left supraclavicular lymph node biopsy by Dr. Nash well tolerated.  Sterile dressing to site. DC instructions understood.  DC ambulatory with all questions answered prior to DC to home per self.  No evident complications.

## 2017-11-13 LAB — COPATH REPORT: NORMAL

## 2017-11-14 DIAGNOSIS — I77.71 CAROTID ARTERY DISSECTION (H): Primary | ICD-10-CM

## 2017-11-14 RX ORDER — ATORVASTATIN CALCIUM 10 MG/1
10 TABLET, FILM COATED ORAL EVERY EVENING
Qty: 90 TABLET | Refills: 2 | Status: SHIPPED | OUTPATIENT
Start: 2017-11-14 | End: 2018-08-22

## 2017-12-08 ENCOUNTER — HOSPITAL PATHOLOGY (OUTPATIENT)
Dept: OTHER | Facility: CLINIC | Age: 52
End: 2017-12-08

## 2017-12-11 LAB — COPATH REPORT: NORMAL

## 2018-01-11 ENCOUNTER — TELEPHONE (OUTPATIENT)
Dept: OTHER | Facility: CLINIC | Age: 53
End: 2018-01-11

## 2018-01-11 NOTE — TELEPHONE ENCOUNTER
Called and left a message for patient stating last attempt to schedule follow up,labs, and imaging for Dr. Gutierrez.    Fidelina ContrerasCMA

## 2018-04-05 DIAGNOSIS — I77.3 FIBROMUSCULAR DYSPLASIA (H): ICD-10-CM

## 2018-04-05 NOTE — TELEPHONE ENCOUNTER
"Last Written Prescription Date:  2/3/17  Last Fill Quantity: 90,  # refills: 3   Last office visit: No previous visit found with prescribing provider:  7/18/17   Future Office Visit:    Requested Prescriptions   Pending Prescriptions Disp Refills     aspirin 81 MG EC tablet 90 tablet 2     Sig: Take 1 tablet (81 mg) by mouth daily    Analgesics (Non-Narcotic Tylenol and ASA Only) Passed    4/5/2018  9:14 AM       Passed - Recent (12 mo) or future (30 days) visit within the authorizing provider's specialty    Patient had office visit in the last 12 months or has a visit in the next 30 days with authorizing provider or within the authorizing provider's specialty.  See \"Patient Info\" tab in inbasket, or \"Choose Columns\" in Meds & Orders section of the refill encounter.           Passed - Patient is age 20 years or older    If ASA is flagged for ages under 20 years old. Forward to provider for confirmation Ryes Syndrome is not a concern.              Prescription approved per [facility] [place] Protocol.  Neelam Figueroa, RN, BSN    "

## 2018-08-22 DIAGNOSIS — I77.71 CAROTID ARTERY DISSECTION (H): ICD-10-CM

## 2018-08-22 RX ORDER — ATORVASTATIN CALCIUM 10 MG/1
TABLET, FILM COATED ORAL
Qty: 90 TABLET | Refills: 0 | Status: SHIPPED | OUTPATIENT
Start: 2018-08-22 | End: 2021-01-18

## 2018-11-02 ENCOUNTER — HOSPITAL ENCOUNTER (OUTPATIENT)
Dept: MAMMOGRAPHY | Facility: CLINIC | Age: 53
Discharge: HOME | End: 2018-11-02
Attending: INTERNAL MEDICINE | Admitting: INTERNAL MEDICINE
Payer: COMMERCIAL

## 2018-11-02 DIAGNOSIS — Z12.31 VISIT FOR SCREENING MAMMOGRAM: ICD-10-CM

## 2018-11-02 PROCEDURE — 77063 BREAST TOMOSYNTHESIS BI: CPT

## 2019-02-01 ENCOUNTER — TRANSFERRED RECORDS (OUTPATIENT)
Dept: HEALTH INFORMATION MANAGEMENT | Facility: CLINIC | Age: 54
End: 2019-02-01

## 2019-05-03 ENCOUNTER — TRANSFERRED RECORDS (OUTPATIENT)
Dept: HEALTH INFORMATION MANAGEMENT | Facility: CLINIC | Age: 54
End: 2019-05-03

## 2019-05-07 DIAGNOSIS — I77.3 FIBROMUSCULAR DYSPLASIA (H): ICD-10-CM

## 2019-05-09 RX ORDER — ASPIRIN 81 MG/1
TABLET ORAL
Qty: 120 TABLET | Refills: 1 | Status: SHIPPED | OUTPATIENT
Start: 2019-05-09 | End: 2020-09-14

## 2019-05-15 NOTE — TELEPHONE ENCOUNTER
Schedule a follow up if symptoms return, but not before 9 weeks if for nails and calluses.  If there is a problem which is not routine (such as infection, injury or ulcer) you can be seen at anytime.       atorvastatin     Last Written Prescription Date: 11/23/16  Last Fill Quantity: 90, # refills: 3  Last Office Visit with [facility] or [facility] prescribing provider: 7/8/17       No results found for: CHOL  No results found for: HDL  No results found for: LDL  No results found for: TRIG  No results found for: CHOLHDLRATIO    UA to refill per  RN refill protocol  Med and pharm loaded  Neelam Figueroa, RN, BSN

## 2019-08-02 ENCOUNTER — TRANSFERRED RECORDS (OUTPATIENT)
Dept: HEALTH INFORMATION MANAGEMENT | Facility: CLINIC | Age: 54
End: 2019-08-02

## 2020-02-19 ENCOUNTER — HOSPITAL ENCOUNTER (OUTPATIENT)
Dept: MAMMOGRAPHY | Facility: CLINIC | Age: 55
Discharge: HOME | End: 2020-02-19
Attending: INTERNAL MEDICINE | Admitting: INTERNAL MEDICINE
Payer: COMMERCIAL

## 2020-02-19 DIAGNOSIS — Z12.31 VISIT FOR SCREENING MAMMOGRAM: ICD-10-CM

## 2020-02-19 PROCEDURE — 77063 BREAST TOMOSYNTHESIS BI: CPT

## 2020-09-14 ENCOUNTER — OFFICE VISIT (OUTPATIENT)
Dept: FAMILY MEDICINE | Facility: CLINIC | Age: 55
End: 2020-09-14

## 2020-09-14 VITALS
WEIGHT: 132 LBS | TEMPERATURE: 98.6 F | DIASTOLIC BLOOD PRESSURE: 80 MMHG | HEIGHT: 63 IN | OXYGEN SATURATION: 99 % | BODY MASS INDEX: 23.39 KG/M2 | HEART RATE: 94 BPM | SYSTOLIC BLOOD PRESSURE: 120 MMHG

## 2020-09-14 DIAGNOSIS — Z00.00 ROUTINE HISTORY AND PHYSICAL EXAMINATION OF ADULT: Primary | ICD-10-CM

## 2020-09-14 DIAGNOSIS — Z76.89 HEALTH CARE HOME: ICD-10-CM

## 2020-09-14 DIAGNOSIS — Z23 NEED FOR VACCINATION: ICD-10-CM

## 2020-09-14 DIAGNOSIS — F33.1 MAJOR DEPRESSIVE DISORDER, RECURRENT EPISODE, MODERATE (H): ICD-10-CM

## 2020-09-14 DIAGNOSIS — Z72.0 TOBACCO ABUSE: ICD-10-CM

## 2020-09-14 DIAGNOSIS — F98.8 ATTENTION DEFICIT DISORDER (ADD) WITHOUT HYPERACTIVITY: ICD-10-CM

## 2020-09-14 PROBLEM — R03.0 ELEVATED BLOOD PRESSURE READING WITHOUT DIAGNOSIS OF HYPERTENSION: Status: ACTIVE | Noted: 2017-11-01

## 2020-09-14 PROBLEM — K64.4 RESIDUAL HEMORRHOIDAL SKIN TAGS: Status: ACTIVE | Noted: 2020-09-14

## 2020-09-14 PROBLEM — C77.9 METASTATIC MELANOMA TO LYMPH NODE (H): Status: ACTIVE | Noted: 2017-02-22

## 2020-09-14 PROBLEM — M17.0 PRIMARY OSTEOARTHRITIS OF BOTH KNEES: Status: ACTIVE | Noted: 2017-02-22

## 2020-09-14 LAB
BUN SERPL-MCNC: 16 MG/DL (ref 7–25)
BUN/CREATININE RATIO: 17.4 (ref 6–22)
CALCIUM SERPL-MCNC: 9.7 MG/DL (ref 8.6–10.3)
CHLORIDE SERPLBLD-SCNC: 103.3 MMOL/L (ref 98–110)
CHOLEST SERPL-MCNC: 153 MG/DL (ref 0–199)
CHOLEST/HDLC SERPL: 2 {RATIO} (ref 0–5)
CO2 SERPL-SCNC: 31.1 MMOL/L (ref 20–32)
CREAT SERPL-MCNC: 0.92 MG/DL (ref 0.7–1.18)
ERYTHROCYTE [DISTWIDTH] IN BLOOD BY AUTOMATED COUNT: 12.4 %
GLUCOSE SERPL-MCNC: 99 MG/DL (ref 60–99)
HCT VFR BLD AUTO: 42.8 % (ref 35–47)
HDLC SERPL-MCNC: 69 MG/DL (ref 40–150)
HEMOGLOBIN: 13.7 G/DL (ref 11.7–15.7)
LDLC SERPL CALC-MCNC: 71 MG/DL (ref 0–130)
MCH RBC QN AUTO: 28.7 PG (ref 26–33)
MCHC RBC AUTO-ENTMCNC: 32 G/DL (ref 31–36)
MCV RBC AUTO: 89.6 FL (ref 78–100)
PLATELET COUNT - QUEST: 302 10^9/L (ref 150–375)
POTASSIUM SERPL-SCNC: 4.83 MMOL/L (ref 3.5–5.3)
RBC # BLD AUTO: 4.78 10*12/L (ref 3.8–5.2)
SODIUM SERPL-SCNC: 139.8 MMOL/L (ref 135–146)
TRIGL SERPL-MCNC: 64 MG/DL (ref 0–149)
WBC # BLD AUTO: 5.2 10*9/L (ref 4–11)

## 2020-09-14 PROCEDURE — 80048 BASIC METABOLIC PNL TOTAL CA: CPT | Performed by: FAMILY MEDICINE

## 2020-09-14 PROCEDURE — 85027 COMPLETE CBC AUTOMATED: CPT | Performed by: FAMILY MEDICINE

## 2020-09-14 PROCEDURE — 36415 COLL VENOUS BLD VENIPUNCTURE: CPT | Performed by: FAMILY MEDICINE

## 2020-09-14 PROCEDURE — 90686 IIV4 VACC NO PRSV 0.5 ML IM: CPT | Performed by: FAMILY MEDICINE

## 2020-09-14 PROCEDURE — 80061 LIPID PANEL: CPT | Performed by: FAMILY MEDICINE

## 2020-09-14 PROCEDURE — 99396 PREV VISIT EST AGE 40-64: CPT | Mod: 25 | Performed by: FAMILY MEDICINE

## 2020-09-14 PROCEDURE — 90471 IMMUNIZATION ADMIN: CPT | Performed by: FAMILY MEDICINE

## 2020-09-14 RX ORDER — METHOCARBAMOL 500 MG/1
500 TABLET, FILM COATED ORAL PRN
COMMUNITY
Start: 2020-03-14

## 2020-09-14 RX ORDER — TOPIRAMATE 25 MG/1
25 TABLET, FILM COATED ORAL 4 TIMES DAILY PRN
COMMUNITY
Start: 2020-05-29 | End: 2023-07-12

## 2020-09-14 RX ORDER — BUPROPION HYDROCHLORIDE 150 MG/1
150 TABLET, EXTENDED RELEASE ORAL 2 TIMES DAILY
COMMUNITY
Start: 2020-08-04 | End: 2020-09-14

## 2020-09-14 RX ORDER — DEXTROAMPHETAMINE SULFATE, DEXTROAMPHETAMINE SACCHARATE, AMPHETAMINE SULFATE AND AMPHETAMINE ASPARTATE 6.25; 6.25; 6.25; 6.25 MG/1; MG/1; MG/1; MG/1
25 CAPSULE, EXTENDED RELEASE ORAL DAILY
COMMUNITY
Start: 2020-04-03 | End: 2020-09-14

## 2020-09-14 RX ORDER — NICOTINE 21 MG/24HR
1 PATCH, TRANSDERMAL 24 HOURS TRANSDERMAL DAILY PRN
Qty: 90 PATCH | Refills: 3 | Status: SHIPPED | OUTPATIENT
Start: 2020-09-14 | End: 2023-07-12

## 2020-09-14 RX ORDER — BUPROPION HYDROCHLORIDE 150 MG/1
150 TABLET, EXTENDED RELEASE ORAL 2 TIMES DAILY
Qty: 180 TABLET | Refills: 3 | Status: SHIPPED | OUTPATIENT
Start: 2020-09-14 | End: 2023-07-12 | Stop reason: DRUGHIGH

## 2020-09-14 RX ORDER — ZOLPIDEM TARTRATE 10 MG/1
10 TABLET ORAL AT BEDTIME
Qty: 30 TABLET | Refills: 0 | Status: SHIPPED | OUTPATIENT
Start: 2020-09-14 | End: 2021-01-18

## 2020-09-14 RX ORDER — CELECOXIB 200 MG/1
200 CAPSULE ORAL DAILY
COMMUNITY
Start: 2020-05-28

## 2020-09-14 RX ORDER — DEXTROAMPHETAMINE SULFATE, DEXTROAMPHETAMINE SACCHARATE, AMPHETAMINE SULFATE AND AMPHETAMINE ASPARTATE 6.25; 6.25; 6.25; 6.25 MG/1; MG/1; MG/1; MG/1
25 CAPSULE, EXTENDED RELEASE ORAL DAILY
Qty: 90 CAPSULE | Refills: 0 | Status: SHIPPED | OUTPATIENT
Start: 2020-09-14 | End: 2021-01-13

## 2020-09-14 SDOH — HEALTH STABILITY: MENTAL HEALTH: HOW OFTEN DO YOU HAVE A DRINK CONTAINING ALCOHOL?: 2-3 TIMES A WEEK

## 2020-09-14 SDOH — HEALTH STABILITY: MENTAL HEALTH: HOW MANY STANDARD DRINKS CONTAINING ALCOHOL DO YOU HAVE ON A TYPICAL DAY?: 1 OR 2

## 2020-09-14 ASSESSMENT — PATIENT HEALTH QUESTIONNAIRE - PHQ9: SUM OF ALL RESPONSES TO PHQ QUESTIONS 1-9: 4

## 2020-09-14 ASSESSMENT — MIFFLIN-ST. JEOR: SCORE: 1172.88

## 2020-09-14 NOTE — LETTER
[facility]  [address]  [address]  [facility] [address]  [phone]      September 14, 2020      Razia Almeida  [address] [facility] [address]      EMERGENCY CARE PLAN  Presenting Problem Treatment Plan   Questions or concerns during clinic hours I will call the clinic directly:    [facility]  [address] , [address]  [place] [address]  [phone]   Questions or concerns outside clinic hours  I will call the 24 hour line at [phone]   Patient needs to schedule an appointment  I will call the  scheduling line at [phone]   Same day treatment   I will call the clinic first, then  urgent care and/or  express care if needed   Clinic Care Coordinators Gunjan Steve RN:  [phone]  [facility] Clinical Support Staff: [phone]    Crisis Services:  Behavioral or Mental Health P (Behavioral Health Providers)   [phone]   Emergency treatment--Immediately CALL [phone]

## 2020-09-14 NOTE — PROGRESS NOTES
SUBJECTIVE:    CC: Razia Almeida is a 55 year old female who presents for physical exam    HPI: over all doing well Some stress with no work  Needs ADD meds  Also uses occasional Ambien  Depression doing well    HISTORIES:    PROBLEM LIST:                   Patient Active Problem List   Diagnosis     Carotid artery dissection (H) ANNA of uncertain age with FMD changes ? 11/1/2016 CT neck done     Neck pain, chronic goes for chiro practic manipulations for the last 20 plus years..     Melanoma found on left axillary nodes recurrent ? No skin lesions.     Raynaud's disease without gangrene     CELINA (generalized anxiety disorder)     GIB (gastrointestinal bleeding)     Acrocyanosis (H)     Cervical cancer screening     Chronic back pain     Elevated blood pressure reading without diagnosis of hypertension     Lateral epicondylitis     ACP (advance care planning)     Migraine     Metastatic melanoma to lymph node (H)     Major depressive disorder, recurrent episode, moderate (H)     Pain medication agreement     Pes planus     Primary osteoarthritis of both hands     Primary osteoarthritis of both knees     Residual hemorrhoidal skin tags     Rotator cuff syndrome of shoulder and allied disorders     Tobacco abuse     Health Care Home       PAST MEDICAL HISTORY:                  Past Medical History:   Diagnosis Date     Anxiety      Cancer (H)     melanoma of left axillry lymphnodes recurrent 10/2016.     Carotid artery dissection (H)      Fibromuscular dysplasia (H)     renal artery     Knee pain, chronic      Migraine headache      Myofascial pain      Neck pain, chronic      Raynaud's disease        PAST SURGICAL HISTORY:                  Past Surgical History:   Procedure Laterality Date     ABDOMEN SURGERY       BIOPSY/REMOVAL, LYMPH NODE(S)       EXCISE MASS NECK Right 11/2/2017    Procedure: EXCISE MASS NECK;  Excision Right Posterior Neck Mass;  Surgeon: Alhaji Mccarthy MD;  Location:  OR      KNEE  SCOPE, W/LATERAL RELEASE       HYSTERECTOMY       LUMPECTOMY BREAST, DISSECT AXILLARY NODE(S), COMBINED Left 11/4/2016    Procedure: COMBINED LUMPECTOMY BREAST, DISSECT AXILLARY NODE(S);  Surgeon: Iris Cota MD;  Location: [facility] OR       CURRENT MEDICATIONS:                  Current Outpatient Medications   Medication Sig Dispense Refill     ACYCLOVIR PO Take 800 mg by mouth 5 times daily PRN       ADDERALL XR 25 MG 24 hr capsule Take 25 mg by mouth daily       atorvastatin (LIPITOR) 10 MG tablet TAKE ONE TABLET BY MOUTH EVERY EVENING 90 tablet 0     buPROPion (WELLBUTRIN SR) 150 MG 12 hr tablet Take 150 mg by mouth 2 times daily        calcium-vitamin D (CALTRATE) 600-400 MG-UNIT per tablet Take 1 tablet by mouth daily       celecoxib (CELEBREX) 200 MG capsule Take 200 mg by mouth daily        DOXAZOSIN MESYLATE PO Take 4 mg by mouth daily        Estradiol (ESTRACE PO) Take 1 mg by mouth daily        Glucosamine-Chondroit-Vit C-Mn (GLUCOSAMINE 1500 COMPLEX) CAPS Take 3,000 mg by mouth every evening       ibuprofen (ADVIL/MOTRIN) 600 MG tablet Take 1 tablet (600 mg) by mouth every 6 hours as needed for moderate pain 30 tablet 1     LORazepam (ATIVAN PO) Take 0.5 mg by mouth daily as needed        methocarbamol (ROBAXIN) 500 MG tablet Take 500 mg by mouth as needed        nicotine (NICODERM CQ) 21 MG/24HR patch 2h hr Place 1 patch onto the skin daily as needed        Probiotic Product (PROBIOTIC DAILY PO) Take 1 capsule by mouth every evening       SUMAtriptan Succinate (IMITREX PO) Take 100 mg by mouth every 8 hours as needed        topiramate (TOPAMAX) 25 MG tablet 25 mg 4 times daily as needed        Zolpidem Tartrate (AMBIEN PO) Take 10 mg by mouth At Bedtime        HYDROcodone-acetaminophen (NORCO) 5-325 MG per tablet Take 1-2 tablets by mouth every 6 hours as needed for moderate to severe pain 12 tablet 0       ALLERGIES:                No Known Allergies    SOCIAL HISTORY:                  Social History      Socioeconomic History     Marital status:      Spouse name: Not on file     Number of children: 2     Years of education: Not on file     Highest education level: Not on file   Occupational History     Occupation:      Employer: [employer]   Social Needs     Financial resource strain: Not on file     Food insecurity     Worry: Not on file     Inability: Not on file     Transportation needs     Medical: Not on file     Non-medical: Not on file   Tobacco Use     Smoking status: Former Smoker     Types: Cigarettes     Smokeless tobacco: Never Used     Tobacco comment: quit in 2011.   Substance and Sexual Activity     Alcohol use: Yes     Alcohol/week: 0.0 - 1.0 standard drinks     Frequency: 2-3 times a week     Drinks per session: 1 or 2     Comment: occas     Drug use: No     Sexual activity: Not on file   Lifestyle     Physical activity     Days per week: Not on file     Minutes per session: Not on file     Stress: Not on file   Relationships     Social connections     Talks on phone: Not on file     Gets together: Not on file     Attends Christian service: Not on file     Active member of club or organization: Not on file     Attends meetings of clubs or organizations: Not on file     Relationship status: Not on file     Intimate partner violence     Fear of current or ex partner: Not on file     Emotionally abused: Not on file     Physically abused: Not on file     Forced sexual activity: Not on file   Other Topics Concern     Parent/sibling w/ CABG, MI or angioplasty before 65F 55M? Not Asked   Social History Narrative     Not on file       FAMILY HISTORY:                   Family History   Problem Relation Age of Onset     Heart Disease Paternal Grandfather         heart disease     Cancer Sister         breast     Breast Cancer Sister      Cerebrovascular Disease Maternal Grandmother      Hypertension Maternal Grandmother        HEALTH MAINTENANCE:                 LAST MAMMOGRAM: 2/20      "             LAST PAP.: NA                 LAST COLONOSCOPY: 2017                   Immunization History   Administered Date(s) Administered     FLU 6-35 months 10/23/2013     Influenza (IIV3) PF 11/03/2005, 10/19/2006     Influenza (intradermal) 10/01/2016     Influenza Vaccine IM > 6 months Valent IIV4 11/15/2013, 10/02/2014, 10/01/2016, 10/03/2018     Influenza Vaccine IM Ages 6-35 Months 4 Valent (PF) 11/15/2013     Influenza,INJ,MDCK,PF,Quad >4yrs 09/20/2019     TD (ADULT, 7+) 09/30/1994     TDAP Vaccine (Boostrix) 07/18/2014     Td (Adult), Adsorbed 04/30/2004     Zoster vaccine recombinant adjuvanted (SHINGRIX) 12/10/2018, 12/15/2018, 10/21/2019           REVIEW OF OUTSIDE RECORDS: YES - Date: multiple       ROS: 10 point ROS neg other than the symptoms noted above in the HPI.      EXAM:    /80 (BP Location: Right arm, Patient Position: Sitting, Cuff Size: Adult Regular)   Pulse 94   Temp 98.6  F (37  C) (Oral)   Ht 1.6 m (5' 3\")   Wt 59.9 kg (132 lb)   SpO2 99%   BMI 23.38 kg/m      GENERAL APPEARANCE: healthy, alert and no distress  EYES: Eyes grossly normal to inspection, PERRL and conjunctivae and sclerae normal  HENT: ear canals and TM's normal, nose and mouth without ulcers or lesions, oropharynx clear and oral mucous membranes moist  NECK: no adenopathy, no asymmetry, masses, or scars and thyroid normal to palpation  RESP: lungs clear to auscultation - no rales, rhonchi or wheezes    CV: regular rates and rhythm, normal S1 S2, no S3 or S4, no murmur, click or rub, no peripheral edema and peripheral pulses strong  ABDOMEN: soft, nontender, no hepatosplenomegaly, no masses and bowel sounds normal  e  MS: no musculoskeletal defects are noted and gait is age appropriate without ataxia  SKIN: no suspicious lesions or rashes  NEURO: Normal strength and tone, sensory exam grossly normal, mentation intact and speech normal  PSYCH: mentation appears normal and affect normal/bright    (Z00.00) " Routine history and physical examination of adult  (primary encounter diagnosis)  Comment: doing well  Plan: HEMOGRAM/PLATELET, Lipid Panel (BFP), Basic         Metabolic Panel (BFP)            (Z76.89) Health Care Home  Comment:   Plan:     (F33.1) Major depressive disorder, recurrent episode, moderate (H)  Comment: refill meds  Plan: zolpidem (AMBIEN) 10 MG tablet, buPROPion         (WELLBUTRIN SR) 150 MG 12 hr tablet            (F98.8) Attention deficit disorder (ADD) without hyperactivity  Comment:  reviewed  Plan: ADDERALL XR 25 MG 24 hr capsule            (Z72.0) Tobacco abuse  Comment:   Plan: nicotine (NICODERM CQ) 21 MG/24HR 24 hr patch                PLAN                   I have discussed with patient the risks, benefits, medications, treatment options and modalities.   I have instructed the patient to call or schedule a follow-up appointment if any problems or failure to improve.

## 2020-09-14 NOTE — LETTER
September 14, 2020      Razia Almeida  [address] [facility] [id number]        Dear ,    We are writing to inform you of your test results.    Your test results fall within the expected range(s) or remain unchanged from previous results.  Please continue with current treatment plan.    Resulted Orders   HEMOGRAM/PLATELET   Result Value Ref Range    WBC 5.2 4.0 - 11 10*9/L    RBC Count 4.78 3.8 - 5.2 10*12/L    Hemoglobin 13.7 11.7 - 15.7 g/dL    Hematocrit 42.8 35.0 - 47.0 %    MCV 89.6 78 - 100 fL    MCH 28.7 26 - 33 pg    MCHC 32.0 31 - 36 g/dL    RDW 12.4 %    Platelet Count 302 150 - 375 10^9/L   Lipid Panel (BFP)   Result Value Ref Range    Cholesterol 153 0 - 199 mg/dL    Triglycerides 64 0 - 149 mg/dL    HDL Cholesterol 69 40 - 150 mg/dL    LDL Cholesterol Direct 71 0 - 130 mg/dL    Cholesterol/HDL Ratio 2 0 - 5   Basic Metabolic Panel (BFP)   Result Value Ref Range    Carbon Dioxide 31.1 20 - 32 mmol/L    Creatinine 0.92 0.70 - 1.18 mg/dL    Glucose 99 60 - 99 mg/dL    Sodium 139.8 135 - 146 mmol/L    Potassium 4.83 3.5 - 5.3 mmol/L    Chloride 103.3 98 - 110 mmol/L    Urea Nitrogen 16 7 - 25 mg/dL    Calcium 9.7 8.6 - 10.3 mg/dL    BUN/Creatinine Ratio 17.4 6 - 22       If you have any questions or concerns, please call the clinic at the number listed above.       Sincerely,        Phong Jarvis MD

## 2020-09-14 NOTE — NURSING NOTE
Razia is here for CPX fasting    Pre-visit Screening:  Immunizations:  up to date  Colonoscopy:  is up to date  Mammogram: is up to date  Asthma Action Test/Plan:  NA  PHQ9:  Done today  GAD7:  Done today  Questioned patient about current smoking habits Pt. quit smoking some time ago.  Ok to leave detailed message on voice mail for today's visit only Yes, phone # [phone]

## 2020-12-15 DIAGNOSIS — F98.8 ATTENTION DEFICIT DISORDER (ADD) WITHOUT HYPERACTIVITY: Primary | ICD-10-CM

## 2020-12-15 RX ORDER — DEXTROAMPHETAMINE SACCHARATE, AMPHETAMINE ASPARTATE MONOHYDRATE, DEXTROAMPHETAMINE SULFATE AND AMPHETAMINE SULFATE 6.25; 6.25; 6.25; 6.25 MG/1; MG/1; MG/1; MG/1
25 CAPSULE, EXTENDED RELEASE ORAL DAILY
Qty: 30 CAPSULE | Refills: 0 | Status: SHIPPED | OUTPATIENT
Start: 2020-12-15 | End: 2021-01-13

## 2020-12-15 RX ORDER — DEXTROAMPHETAMINE SACCHARATE, AMPHETAMINE ASPARTATE MONOHYDRATE, DEXTROAMPHETAMINE SULFATE AND AMPHETAMINE SULFATE 6.25; 6.25; 6.25; 6.25 MG/1; MG/1; MG/1; MG/1
25 CAPSULE, EXTENDED RELEASE ORAL DAILY
Qty: 30 CAPSULE | Refills: 0 | Status: SHIPPED | OUTPATIENT
Start: 2021-01-15 | End: 2021-01-13

## 2020-12-15 RX ORDER — DEXTROAMPHETAMINE SACCHARATE, AMPHETAMINE ASPARTATE MONOHYDRATE, DEXTROAMPHETAMINE SULFATE AND AMPHETAMINE SULFATE 6.25; 6.25; 6.25; 6.25 MG/1; MG/1; MG/1; MG/1
25 CAPSULE, EXTENDED RELEASE ORAL DAILY
Qty: 30 CAPSULE | Refills: 0 | Status: SHIPPED | OUTPATIENT
Start: 2021-02-15 | End: 2021-01-13

## 2020-12-15 NOTE — TELEPHONE ENCOUNTER
Pt called requesting her next 3 months of Adderall.     Pending Prescriptions:                       Disp   Refills    amphetamine-dextroamphetamine (ADDERALL X*30 cap*0            Sig: Take 1 capsule (25 mg) by mouth daily    amphetamine-dextroamphetamine (ADDERALL X*30 cap*0            Sig: Take 1 capsule (25 mg) by mouth daily    amphetamine-dextroamphetamine (ADDERALL X*30 cap*0            Sig: Take 1 capsule (25 mg) by mouth daily

## 2020-12-16 DIAGNOSIS — Z72.0 TOBACCO ABUSE: ICD-10-CM

## 2021-01-13 DIAGNOSIS — F98.8 ATTENTION DEFICIT DISORDER (ADD) WITHOUT HYPERACTIVITY: ICD-10-CM

## 2021-01-13 RX ORDER — DEXTROAMPHETAMINE SULFATE, DEXTROAMPHETAMINE SACCHARATE, AMPHETAMINE SULFATE AND AMPHETAMINE ASPARTATE 6.25; 6.25; 6.25; 6.25 MG/1; MG/1; MG/1; MG/1
25 CAPSULE, EXTENDED RELEASE ORAL DAILY
Qty: 90 CAPSULE | Refills: 0 | Status: SHIPPED | OUTPATIENT
Start: 2021-01-13 | End: 2023-07-12

## 2021-01-13 RX ORDER — DEXTROAMPHETAMINE SULFATE, DEXTROAMPHETAMINE SACCHARATE, AMPHETAMINE SULFATE AND AMPHETAMINE ASPARTATE 6.25; 6.25; 6.25; 6.25 MG/1; MG/1; MG/1; MG/1
25 CAPSULE, EXTENDED RELEASE ORAL DAILY
Qty: 90 CAPSULE | Refills: 0 | OUTPATIENT
Start: 2021-01-13

## 2021-01-13 NOTE — TELEPHONE ENCOUNTER
Pt can get a 90 day supply of her Adderall and is cheaper for her co-pay. She is for her next 30 day refill now . She only has 2 months remaining. She is wanting the 90 day and is keeping her OV for the end of January, knows she is early for medication check, but want to see you one last time.    Razia AMANDA Almeida is requesting a refill of:    Pending Prescriptions:                       Disp   Refills    ADDERALL XR 25 MG 24 hr capsule           90 cap*0            Sig: Take 1 capsule (25 mg) by mouth daily  Refused Prescriptions:                       Disp   Refills    ADDERALL XR 25 MG 24 hr capsule            90 cap*0        Sig: Take 1 capsule (25 mg) by mouth daily  Refused By: JEANETTE OVIEDO  Reason for Refusal: Patient has requested refill too soon

## 2021-01-13 NOTE — TELEPHONE ENCOUNTER
Razia Almeida is requesting a refill of:    Pending Prescriptions:                       Disp   Refills    ADDERALL XR 25 MG 24 hr capsule           90 cap*0            Sig: Take 1 capsule (25 mg) by mouth daily    Pt has upcoming visit, but requests a 90 day refill. Has 3 30 days sent on 12/15/20    ThanksRazia

## 2021-01-18 ENCOUNTER — OFFICE VISIT (OUTPATIENT)
Dept: FAMILY MEDICINE | Facility: CLINIC | Age: 56
End: 2021-01-18

## 2021-01-18 ENCOUNTER — TELEPHONE (OUTPATIENT)
Dept: FAMILY MEDICINE | Facility: CLINIC | Age: 56
End: 2021-01-18

## 2021-01-18 VITALS
OXYGEN SATURATION: 97 % | TEMPERATURE: 97.8 F | HEIGHT: 64 IN | BODY MASS INDEX: 22.71 KG/M2 | DIASTOLIC BLOOD PRESSURE: 80 MMHG | WEIGHT: 133 LBS | SYSTOLIC BLOOD PRESSURE: 124 MMHG | HEART RATE: 79 BPM

## 2021-01-18 DIAGNOSIS — I77.71 CAROTID ARTERY DISSECTION (H): ICD-10-CM

## 2021-01-18 DIAGNOSIS — Z00.00 ROUTINE HISTORY AND PHYSICAL EXAMINATION OF ADULT: Primary | ICD-10-CM

## 2021-01-18 DIAGNOSIS — N95.1 MENOPAUSAL SYNDROME (HOT FLASHES): ICD-10-CM

## 2021-01-18 DIAGNOSIS — F33.1 MAJOR DEPRESSIVE DISORDER, RECURRENT EPISODE, MODERATE (H): ICD-10-CM

## 2021-01-18 DIAGNOSIS — B00.2 ORAL HERPES: ICD-10-CM

## 2021-01-18 DIAGNOSIS — G43.909 MIGRAINE WITHOUT STATUS MIGRAINOSUS, NOT INTRACTABLE, UNSPECIFIED MIGRAINE TYPE: ICD-10-CM

## 2021-01-18 DIAGNOSIS — Z72.0 TOBACCO ABUSE: ICD-10-CM

## 2021-01-18 PROCEDURE — 99396 PREV VISIT EST AGE 40-64: CPT | Performed by: FAMILY MEDICINE

## 2021-01-18 RX ORDER — ZOLPIDEM TARTRATE 10 MG/1
10 TABLET ORAL AT BEDTIME
Qty: 30 TABLET | Refills: 1 | Status: SHIPPED | OUTPATIENT
Start: 2021-01-18

## 2021-01-18 RX ORDER — ATORVASTATIN CALCIUM 10 MG/1
10 TABLET, FILM COATED ORAL EVERY MORNING
Qty: 90 TABLET | Refills: 3 | Status: SHIPPED | OUTPATIENT
Start: 2021-01-18

## 2021-01-18 RX ORDER — ACYCLOVIR 800 MG/1
800 TABLET ORAL
Qty: 10 TABLET | Refills: 1 | Status: SHIPPED | OUTPATIENT
Start: 2021-01-18

## 2021-01-18 RX ORDER — NICOTINE 21 MG/24HR
1 PATCH, TRANSDERMAL 24 HOURS TRANSDERMAL EVERY 24 HOURS
Qty: 28 PATCH | Refills: 4 | Status: SHIPPED | OUTPATIENT
Start: 2021-01-18 | End: 2023-07-12

## 2021-01-18 RX ORDER — ESTRADIOL 1 MG/1
1.5 TABLET ORAL DAILY
Qty: 120 TABLET | Refills: 3 | Status: SHIPPED | OUTPATIENT
Start: 2021-01-18

## 2021-01-18 RX ORDER — SUMATRIPTAN 100 MG/1
100 TABLET, FILM COATED ORAL
Qty: 30 TABLET | Refills: 3 | Status: SHIPPED | OUTPATIENT
Start: 2021-01-18

## 2021-01-18 ASSESSMENT — MIFFLIN-ST. JEOR: SCORE: 1185.34

## 2021-01-18 NOTE — PROGRESS NOTES
SUBJECTIVE:    CC: Razia Almeida is a 55 year old female who presents for physical    HPI: over all doing well  Needs meds refilled    HISTORIES:    PROBLEM LIST:                   Patient Active Problem List   Diagnosis     Carotid artery dissection (H) ANNA of uncertain age with FMD changes ? 11/1/2016 CT neck done     Neck pain, chronic goes for chiro practic manipulations for the last 20 plus years..     Melanoma found on left axillary nodes recurrent ? No skin lesions.     Raynaud's disease without gangrene     CELINA (generalized anxiety disorder)     GIB (gastrointestinal bleeding)     Acrocyanosis (H)     Cervical cancer screening     Chronic back pain     Elevated blood pressure reading without diagnosis of hypertension     Lateral epicondylitis     ACP (advance care planning)     Migraine     Metastatic melanoma to lymph node (H)     Major depressive disorder, recurrent episode, moderate (H)     Pain medication agreement     Pes planus     Primary osteoarthritis of both hands     Primary osteoarthritis of both knees     Residual hemorrhoidal skin tags     Rotator cuff syndrome of shoulder and allied disorders     Tobacco abuse     Health Care Home       PAST MEDICAL HISTORY:                  Past Medical History:   Diagnosis Date     Anxiety      Cancer (H)     melanoma of left axillry lymphnodes recurrent 10/2016.     Carotid artery dissection (H)      Fibromuscular dysplasia (H)     renal artery     Knee pain, chronic      Migraine headache      Myofascial pain      Neck pain, chronic      Raynaud's disease        PAST SURGICAL HISTORY:                  Past Surgical History:   Procedure Laterality Date     ABDOMEN SURGERY       BIOPSY/REMOVAL, LYMPH NODE(S)       EXCISE MASS NECK Right 11/2/2017    Procedure: EXCISE MASS NECK;  Excision Right Posterior Neck Mass;  Surgeon: Alhaji Mccarthy MD;  Location:  OR      KNEE SCOPE, W/LATERAL RELEASE       HYSTERECTOMY       LUMPECTOMY BREAST, DISSECT  AXILLARY NODE(S), COMBINED Left 11/4/2016    Procedure: COMBINED LUMPECTOMY BREAST, DISSECT AXILLARY NODE(S);  Surgeon: Iris Cota MD;  Location: [facility] OR       CURRENT MEDICATIONS:                  Current Outpatient Medications   Medication Sig Dispense Refill     acyclovir (ZOVIRAX) 800 MG tablet Take 1 tablet (800 mg) by mouth 5 times daily PRN 10 tablet 1     ADDERALL XR 25 MG 24 hr capsule Take 1 capsule (25 mg) by mouth daily 90 capsule 0     atorvastatin (LIPITOR) 10 MG tablet Take 1 tablet (10 mg) by mouth every morning 90 tablet 3     buPROPion (WELLBUTRIN SR) 150 MG 12 hr tablet Take 1 tablet (150 mg) by mouth 2 times daily 180 tablet 3     calcium-vitamin D (CALTRATE) 600-400 MG-UNIT per tablet Take 1 tablet by mouth daily       celecoxib (CELEBREX) 200 MG capsule Take 200 mg by mouth daily        DOXAZOSIN MESYLATE PO Take 4 mg by mouth daily        estradiol (ESTRACE) 1 MG tablet Take 1.5 tablets (1.5 mg) by mouth daily 120 tablet 3     Glucosamine-Chondroit-Vit C-Mn (GLUCOSAMINE 1500 COMPLEX) CAPS Take 3,000 mg by mouth every evening       HYDROcodone-acetaminophen (NORCO) 5-325 MG per tablet Take 1-2 tablets by mouth every 6 hours as needed for moderate to severe pain 12 tablet 0     ibuprofen (ADVIL/MOTRIN) 600 MG tablet Take 1 tablet (600 mg) by mouth every 6 hours as needed for moderate pain 30 tablet 1     LORazepam (ATIVAN PO) Take 0.5 mg by mouth daily as needed        methocarbamol (ROBAXIN) 500 MG tablet Take 500 mg by mouth as needed        nicotine (NICODERM CQ) 21 MG/24HR 24 hr patch Place 1 patch onto the skin every 24 hours 28 patch 4     nicotine (NICODERM CQ) 21 MG/24HR 24 hr patch Place 1 patch onto the skin daily as needed 90 patch 3     Probiotic Product (PROBIOTIC DAILY PO) Take 1 capsule by mouth every evening       SUMAtriptan (IMITREX) 100 MG tablet Take 1 tablet (100 mg) by mouth at onset of headache 30 tablet 3     topiramate (TOPAMAX) 25 MG tablet 25 mg 4 times daily as  needed        zolpidem (AMBIEN) 10 MG tablet Take 1 tablet (10 mg) by mouth At Bedtime 30 tablet 1       ALLERGIES:                No Known Allergies    SOCIAL HISTORY:                  Social History     Socioeconomic History     Marital status:      Spouse name: Not on file     Number of children: 2     Years of education: Not on file     Highest education level: Not on file   Occupational History     Occupation:      Employer: [employer]   Social Needs     Financial resource strain: Not on file     Food insecurity     Worry: Not on file     Inability: Not on file     Transportation needs     Medical: Not on file     Non-medical: Not on file   Tobacco Use     Smoking status: Former Smoker     Types: Cigarettes     Smokeless tobacco: Never Used     Tobacco comment: quit in 2011.   Substance and Sexual Activity     Alcohol use: Yes     Alcohol/week: 0.0 - 1.0 standard drinks     Frequency: 2-3 times a week     Drinks per session: 1 or 2     Comment: occas     Drug use: No     Sexual activity: Not on file   Lifestyle     Physical activity     Days per week: Not on file     Minutes per session: Not on file     Stress: Not on file   Relationships     Social connections     Talks on phone: Not on file     Gets together: Not on file     Attends Hoahaoism service: Not on file     Active member of club or organization: Not on file     Attends meetings of clubs or organizations: Not on file     Relationship status: Not on file     Intimate partner violence     Fear of current or ex partner: Not on file     Emotionally abused: Not on file     Physically abused: Not on file     Forced sexual activity: Not on file   Other Topics Concern     Parent/sibling w/ CABG, MI or angioplasty before 65F 55M? Not Asked   Social History Narrative     Not on file       FAMILY HISTORY:                   Family History   Problem Relation Age of Onset     Heart Disease Paternal Grandfather         heart disease     Cancer  "Sister         breast     Breast Cancer Sister      Cerebrovascular Disease Maternal Grandmother      Hypertension Maternal Grandmother        HEALTH MAINTENANCE:                 LAST MAMMOGRAM: 2/20                  LAST PAP.: N/A                 LAST COLONOSCOPY: UTD                   Immunization History   Administered Date(s) Administered     FLU 6-35 months 10/23/2013     Influenza (IIV3) PF 11/03/2005, 10/19/2006     Influenza (intradermal) 10/01/2016     Influenza Vaccine IM > 6 months Valent IIV4 11/15/2013, 10/02/2014, 10/01/2016, 10/03/2018, 09/14/2020     Influenza Vaccine IM Ages 6-35 Months 4 Valent (PF) 11/15/2013     Influenza,INJ,MDCK,PF,Quad >4yrs 09/20/2019     TD (ADULT, 7+) 09/30/1994     TDAP Vaccine (Boostrix) 07/18/2014     Td (Adult), Adsorbed 04/30/2004     Zoster vaccine recombinant adjuvanted (SHINGRIX) 12/10/2018, 12/15/2018, 10/21/2019           REVIEW OF OUTSIDE RECORDS: YES - Date: 2020       ROS: 10 point ROS neg other than the symptoms noted above in the HPI.  Small lump on right side of neck  EXAM:    /80 (BP Location: Right arm, Patient Position: Sitting, Cuff Size: Adult Large)   Pulse 79   Temp 97.8  F (36.6  C) (Oral)   Ht 1.613 m (5' 3.5\")   Wt 60.3 kg (133 lb)   SpO2 97%   BMI 23.19 kg/m      GENERAL APPEARANCE: healthy, alert and no distress  EYES: Eyes grossly normal to inspection, PERRL and conjunctivae and sclerae normal  HENT: ear canals and TM's normal, nose and mouth without ulcers or lesions, oropharynx clear and oral mucous membranes moist  NECK: no adenopathy, no asymmetry, masses, or scars and thyroid normal to palpation small cyst on side of neck BB size  RESP: lungs clear to auscultation - no rales, rhonchi or wheezes  BREAST: normal without masses, tenderness or nipple discharge and no palpable axillary masses or adenopathy  CV: regular rates and rhythm, normal S1 S2, no S3 or S4, no murmur, click or rub, no peripheral edema and peripheral pulses " strong  ABDOMEN: soft, nontender, no hepatosplenomegaly, no masses and bowel sounds normal    MS: no musculoskeletal defects are noted and gait is age appropriate without ataxia  SKIN: no suspicious lesions or rashes  NEURO: Normal strength and tone, sensory exam grossly normal, mentation intact and speech normal  PSYCH: mentation appears normal and affect normal/bright    (Z00.00) Routine history and physical examination of adult  (primary encounter diagnosis)  Comment: over all doing well  Plan: small cyst on neck believe not anything worisome has PET scan in 1 week    (I77.71) Carotid artery dissection (H)  Comment:   Plan: atorvastatin (LIPITOR) 10 MG tablet            (F33.1) Major depressive disorder, recurrent episode, moderate (H)  Comment:   Plan: zolpidem (AMBIEN) 10 MG tablet            (Z72.0) Tobacco abuse  Comment: pt is not smoking   Plan: nicotine (NICODERM CQ) 21 MG/24HR 24 hr patch        Still using patch    (N95.1) Menopausal syndrome (hot flashes)  Comment: try weaning estrace down  Plan: estradiol (ESTRACE) 1 MG tablet            (G43.909) Migraine without status migrainosus, not intractable, unspecified migraine type  Comment:   Plan: SUMAtriptan (IMITREX) 100 MG tablet            (B00.2) Oral herpes  Comment:   Plan: acyclovir (ZOVIRAX) 800 MG tablet                               I have discussed with patient the risks, benefits, medications, treatment options and modalities.   I have instructed the patient to call or schedule a follow-up appointment if any problems or failure to improve.

## 2021-01-18 NOTE — NURSING NOTE
Razia is here for a CPX.    Pre-Visit Screening:  Immunizations:UTD  Colonoscopy:UTD  Mammogram:UTd  Asthma Action Test/Plan:NA  PHQ9:today  GAD7:today  Questioned patient about current smoking habits Pt.former smoker  OK to leave a detailed message on voice mail for today's visit yes, phone # [phone]

## 2021-02-05 ENCOUNTER — TRANSFERRED RECORDS (OUTPATIENT)
Dept: FAMILY MEDICINE | Facility: CLINIC | Age: 56
End: 2021-02-05

## 2021-12-09 ENCOUNTER — OFFICE VISIT (OUTPATIENT)
Dept: URGENT CARE | Facility: URGENT CARE | Age: 56
End: 2021-12-09
Payer: COMMERCIAL

## 2021-12-09 VITALS
RESPIRATION RATE: 18 BRPM | SYSTOLIC BLOOD PRESSURE: 143 MMHG | TEMPERATURE: 98.8 F | OXYGEN SATURATION: 100 % | DIASTOLIC BLOOD PRESSURE: 91 MMHG | HEART RATE: 103 BPM

## 2021-12-09 DIAGNOSIS — S61.209A AVULSION OF FINGER, INITIAL ENCOUNTER: Primary | ICD-10-CM

## 2021-12-09 PROCEDURE — 90471 IMMUNIZATION ADMIN: CPT | Performed by: PHYSICIAN ASSISTANT

## 2021-12-09 PROCEDURE — 90715 TDAP VACCINE 7 YRS/> IM: CPT | Performed by: PHYSICIAN ASSISTANT

## 2021-12-09 PROCEDURE — 99213 OFFICE O/P EST LOW 20 MIN: CPT | Mod: 25 | Performed by: PHYSICIAN ASSISTANT

## 2021-12-09 RX ORDER — SULFAMETHOXAZOLE/TRIMETHOPRIM 800-160 MG
1 TABLET ORAL 2 TIMES DAILY
Qty: 14 TABLET | Refills: 0 | Status: SHIPPED | OUTPATIENT
Start: 2021-12-09 | End: 2021-12-16

## 2021-12-09 ASSESSMENT — PAIN SCALES - GENERAL: PAINLEVEL: EXTREME PAIN (8)

## 2021-12-10 NOTE — PROGRESS NOTES
SUBJECTIVE:     Chief Complaint   Patient presents with     Work Comp     index finger laceration     Razia Almeida is a 56 year old female who presents to the clinic with a laceration on the left index finger sustained 1 hour(s) ago.  This is a accidental injury.    Mechanism of injury: circular blade.    Associated symptoms: Denies loss of consciousness, vomiting or confusion.  Denies numbness, weakness, or loss of function  Last tetanus booster within 10 years: yes 2014    EXAM:   The patient appears today in alert,no apparent distress distress  VITALS: BP (!) 143/91   Pulse 103   Temp 98.8  F (37.1  C)   Resp 18   SpO2 100%     Size of laceration: 3 centimeters  Characteristics of the laceration: bleeding- mild, dirty and avulsion  Tendon function intact: yes  Sensation to light touch intact: yes  Pulses intact: yes  Picture included in patient's chart: no    Assessment/Plan:   (S61.532A) Avulsion of finger, initial encounter  (primary encounter diagnosis)  Plan: sulfamethoxazole-trimethoprim (BACTRIM DS)         800-160 MG tablet     Since this was an avulsion, I was unable to repair wound with suturing. Wound was soaked in warm soaping water, irrigated, cleaned with betadine swabs and isopropyl alcohol. Surgifoam was placed over the wound and a compression dressing was placed alond with a finger splint to limit motion.     Patient was educated on wound care and signs of infection.

## 2021-12-10 NOTE — PATIENT INSTRUCTIONS
Patient Education     Skin Tear (Skin Avulsion)  A skin tear (skin avulsion) is a tearing of the top layer of skin. This commonly happens after a fall or other injury. This is especially true if you have thinner skin, are an older adult, or have taken steroids for long periods of time.   Home care  These guidelines will help you care for your wound at home:    Keep the wound clean and dry for the first 24 to 48 hours, or as your healthcare provider advises.    If there is a dressing or bandage, change it when it gets wet or dirty. Otherwise, leave it on for the first 24 hours, then change it once a day or as often as the healthcare provider says.    If stitches or staples were used, check the wound every day.    After taking off the dressing, wash the area gently with soap and water. Clean as close to the stitches as you can. Don't wash or rub the stitches directly.    After 3 days you can keep the bandages off the wound, unless told otherwise, or there is continued drainage.  Allow the wound to be open to the air.    Keep a thin layer of antibiotic ointment on the cut. This will keep the wound clean, make it easier to remove the stitches, and reduce scarring.    If your wound is oozing, you can put a nonstick dressing over it. Then, reapply the bandage or dressing as you were told.    You can shower as usual after the first 24 hours, but don't soak the area in water (no baths or swimming) until the stitches or staples are taken out.    If surgical tape was used, keep the area clean and dry. If it becomes wet, blot it dry with a clean towel.    Be very careful when removing tape or other dressings, or you may cause more skin tears. Soaking the dressing in the shower for a few minutes will often loosen it and make it easier to remove.    If skin glue was used, don't put any creams, lotions, or antibiotic ointments on it. These can dissolve the glue. Usually the glue will flake off in about 5 to 10 days by itself.  Try to resist picking it off before that so the wound doesn't open up. When it gets wet, pat it dry.  Here is some information about medicine:    You may use over-the-counter medicine such as acetaminophen, naproxen, or ibuprofen to control pain, unless another pain medicine was given. If you have chronic liver or kidney disease or ever had a stomach ulcer or gastrointestinal bleeding, talk with your healthcare provider before using these medicines.    If you were given antibiotics, take them until they are all used up. It is important to finish the antibiotics even if the wound looks better. This will ensure that the infection has cleared.  Follow-up care  Follow up with your healthcare provider, or as advised.    Watch for any signs of infection, such as increasing redness, swelling, or pus coming out of the wound. If this happens, don't wait for your scheduled visit. Instead, see your healthcare provider right away.    Stitches or staples are usually taken out within 5 to 14 days. This varies depending on what part of your body they are on, and the type of wound. Your provider will tell you how long stitches or staples should be left in.     If surgical tape was used, it's usually left on for 7 to 10 days. You can remove surgical tape after that unless you were told otherwise. If you try to remove it, and it's too hard, soaking can help. Surgical tape strips will eventually fall off on their own. If the edges of the cut pull apart, stop removing the tape or strips and follow up with your provider    As mentioned above, skin glue will flake off by itself in 5 to 10 days, so you don't need to pull it off.  If any X-rays were done, you will be notified of any changes that may affect your care.   When to seek medical advice  Call your healthcare provider right away if any of these occur:    Increasing pain in the wound    Redness, swelling, or pus coming from the wound    Fever of 100.4 F (38 C) or higher, or as  directed by your healthcare provider    Sutures or staples come apart or fall out before your next appointment and the wound edges look as if they will re-open    Surgical tape closures fall off before 7 days, and the wound edges look as if they will re-open    Bleeding not controlled by direct pressure  Desirae last reviewed this educational content on 8/1/2019 2000-2021 [employer]. All rights reserved. This information is not intended as a substitute for professional medical care. Always follow your healthcare professional's instructions.

## 2023-06-07 ENCOUNTER — TRANSFERRED RECORDS (OUTPATIENT)
Dept: HEALTH INFORMATION MANAGEMENT | Facility: CLINIC | Age: 58
End: 2023-06-07
Payer: COMMERCIAL

## 2023-06-13 ENCOUNTER — TELEPHONE (OUTPATIENT)
Dept: RADIOLOGY | Facility: CLINIC | Age: 58
End: 2023-06-13
Payer: COMMERCIAL

## 2023-06-13 NOTE — TELEPHONE ENCOUNTER
Reviewed biopsy requests 6/13 with Dr. Walters. He would like to start with ultrasounds of all 3 areas with lymph node biopsies are requested to assess if lymph nodes are visible with US. Then can schedule Bx to follow. Rowan

## 2023-06-28 ENCOUNTER — HOSPITAL ENCOUNTER (OUTPATIENT)
Dept: ULTRASOUND IMAGING | Facility: CLINIC | Age: 58
Discharge: HOME | End: 2023-06-28
Attending: INTERNAL MEDICINE
Payer: COMMERCIAL

## 2023-06-28 ENCOUNTER — HOSPITAL ENCOUNTER (OUTPATIENT)
Dept: MAMMOGRAPHY | Facility: CLINIC | Age: 58
Discharge: HOME | End: 2023-06-28
Attending: INTERNAL MEDICINE
Payer: COMMERCIAL

## 2023-06-28 DIAGNOSIS — C43.9 MALIGNANT MELANOMA OF SKIN (H): ICD-10-CM

## 2023-06-28 PROCEDURE — 76882 US LMTD JT/FCL EVL NVASC XTR: CPT | Mod: RT

## 2023-06-28 PROCEDURE — 76882 US LMTD JT/FCL EVL NVASC XTR: CPT | Mod: LT,XS

## 2023-06-28 PROCEDURE — 76536 US EXAM OF HEAD AND NECK: CPT

## 2023-06-28 PROCEDURE — 77062 BREAST TOMOSYNTHESIS BI: CPT

## 2023-07-10 ENCOUNTER — HOSPITAL ENCOUNTER (OUTPATIENT)
Dept: ULTRASOUND IMAGING | Facility: CLINIC | Age: 58
Discharge: HOME | End: 2023-07-10
Attending: RADIOLOGY | Admitting: RADIOLOGY
Payer: COMMERCIAL

## 2023-07-10 DIAGNOSIS — R22.1 LUMP ON NECK: ICD-10-CM

## 2023-07-10 PROCEDURE — 76536 US EXAM OF HEAD AND NECK: CPT

## 2023-07-10 PROCEDURE — 250N000009 HC RX 250: Performed by: RADIOLOGY

## 2023-07-10 RX ADMIN — LIDOCAINE HYDROCHLORIDE 5 ML: 10 INJECTION, SOLUTION EPIDURAL; INFILTRATION; INTRACAUDAL; PERINEURAL at 09:09

## 2023-07-10 NOTE — PROGRESS NOTES
Assisted Dr. Willett in attempted biopsy of right neck biopsy for patient. Pt unable to tolerated procedure. No samples collected. Per Dr. Willett needle biopsy of this mass in not reasonable at this time. Called and updated Pt oncologist office and spoke with Dr. Alvarado's nurse Monica. Pt left in stable condition and all questions answered.

## 2023-07-11 ENCOUNTER — TRANSFERRED RECORDS (OUTPATIENT)
Dept: SURGERY | Facility: CLINIC | Age: 58
End: 2023-07-11
Payer: COMMERCIAL

## 2023-07-12 ENCOUNTER — OFFICE VISIT (OUTPATIENT)
Dept: SURGERY | Facility: CLINIC | Age: 58
End: 2023-07-12
Payer: COMMERCIAL

## 2023-07-12 VITALS
RESPIRATION RATE: 16 BRPM | DIASTOLIC BLOOD PRESSURE: 78 MMHG | BODY MASS INDEX: 21.85 KG/M2 | OXYGEN SATURATION: 97 % | HEIGHT: 64 IN | HEART RATE: 93 BPM | SYSTOLIC BLOOD PRESSURE: 120 MMHG | WEIGHT: 128 LBS

## 2023-07-12 DIAGNOSIS — R22.1 NECK MASS: ICD-10-CM

## 2023-07-12 DIAGNOSIS — C43.9 MELANOMA OF SKIN (H): Primary | ICD-10-CM

## 2023-07-12 PROCEDURE — 99205 OFFICE O/P NEW HI 60 MIN: CPT | Performed by: STUDENT IN AN ORGANIZED HEALTH CARE EDUCATION/TRAINING PROGRAM

## 2023-07-12 RX ORDER — SUMATRIPTAN 50 MG/1
50 TABLET, FILM COATED ORAL DAILY
COMMUNITY
Start: 2023-05-12 | End: 2023-07-12 | Stop reason: DRUGHIGH

## 2023-07-12 RX ORDER — DOXAZOSIN 4 MG/1
4 TABLET ORAL DAILY
COMMUNITY
Start: 2023-05-16

## 2023-07-12 RX ORDER — LORAZEPAM 0.5 MG/1
0.5 TABLET ORAL EVERY 8 HOURS PRN
COMMUNITY
Start: 2023-05-12

## 2023-07-12 RX ORDER — BUPROPION HYDROCHLORIDE 150 MG/1
150 TABLET ORAL DAILY
COMMUNITY
Start: 2023-05-12

## 2023-07-12 NOTE — PROGRESS NOTES
[facility]  New Patient Office Visit      Razia Almeida is a 57 year old female seen in consultation for enlarged right neck mass and left axillary nodule at the request of Junior Marino MD.       Assessment and Plan:  #right neck mass   #left axillary nodule   #history of metastatic melanoma - left axilla (history detailed in HPI)    The left axillary nodule is not palpable on exam today. For this I have recommended proceeding with biopsy (scheduleded for tomorrow) and we will go from there depending on pathology results.     The right neck mass is more difficult. Surgery to remove the mass is possible although I am not sure how necessary it is. It is not very bothersome to her but on exam It is fairly sensitive and in consultation with one of my partners the spinal accessory nerve may be close by. Surgery may carry with it some morbidity such as possible damage to the spinal accessory nerve and/or persistent pain even after removal. I will message her oncologist to determine their thoughts on the neck mass. If tissue sampling of the mass is recommended by her oncologist then I would recommend repeat percutaneous biopsy with sedation over surgery at this time.           Chief complaint:  Right neck mass and left axillary nodule     HPI:  Razia Almeida is a 57 year old female with a history of metastatic melanoma who presents for evaluation of a right neck mass and a left axillary nodule. She first noticed these a couple months ago and they have been relatively stable in size. An attempt at biopsy of the right neck nodule was attempted but too painful and aborted. She is scheduled to have the left axillary nodule biopsied tomorrow. They are not particularly bothersome to her but the right neck nodule can hurt sometimes.     She has a complicated history related to metastatic melanoma. This was first diagnosed in 2010 after a biopsy of an enlarged left axillary lymph node showed melanoma. A  primary tumor was never found. She underwent axillary lymph node dissection a short time later. She was then placed on adjuvant immunotherapy. An enlarged nodule/lymph node was found again in the left axilla in 2016 and this was removed and an en bloc resection of the surrounding tissue was also performed. This was done by Dr Cota. She is not on any immunotherapy at this time and had issues with diarrhea and fatigue when she was on it.     A PET-CT was performed which did not show avidity for this right neck mass or left axillary nodule. A avid nodule in the right inguinal region was seen but on US there were no abnormal lymph nodes and the PET avidity in the inguinal region was attributed to her prior hernia repair with mesh.     She was seen recently in the oncology office and I have reviewed their documentation/notes    Past Medical History:  Past Medical History:   Diagnosis Date     Anxiety      Cancer (H)     melanoma of left axillry lymphnodes recurrent 10/2016.     Carotid artery dissection (H)      Fibromuscular dysplasia (H)     renal artery     Knee pain, chronic      Migraine headache      Myofascial pain      Neck pain, chronic      Raynaud's disease        Past Surgical History:  Past Surgical History:   Procedure Laterality Date     ABDOMEN SURGERY       BIOPSY/REMOVAL, LYMPH NODE(S)       EXCISE MASS NECK Right 11/2/2017    Procedure: EXCISE MASS NECK;  Excision Right Posterior Neck Mass;  Surgeon: Alhaji Mccarthy MD;  Location:  OR      KNEE SCOPE, W/LATERAL RELEASE       HYSTERECTOMY       LUMPECTOMY BREAST, DISSECT AXILLARY NODE(S), COMBINED Left 11/4/2016    Procedure: COMBINED LUMPECTOMY BREAST, DISSECT AXILLARY NODE(S);  Surgeon: Iris Cota MD;  Location:  OR       Social History:  Social History     Tobacco Use     Smoking status: Former     Types: Cigarettes     Smokeless tobacco: Never     Tobacco comments:     quit in 2011.   Substance Use Topics     Alcohol use: Yes  "    Alcohol/week: 0.0 - 1.0 standard drinks of alcohol     Comment: occas     Drug use: No        Family History:  Family History   Problem Relation Age of Onset     Heart Disease Paternal Grandfather         heart disease     Cancer Sister         breast     Breast Cancer Sister      Cerebrovascular Disease Maternal Grandmother      Hypertension Maternal Grandmother      Review of Systems:  The review of systems is negative other than noted in the HPI and above.    Physical Exam:  Vitals: /78   Pulse 93   Resp 16   Ht 1.613 m (5' 3.5\")   Wt 58.1 kg (128 lb)   SpO2 97%   BMI 22.32 kg/m    BMI= Body mass index is 22.32 kg/m .  General - Well developed, well nourished female in no apparent distress  Right neck: just posterior to the SCM there is a hard pea sized, mobile, non-tender nodule; no other abnormalities in the right neck   Left axilla: surgical scars and findings consistent with prior axillary surgery, no appreciable mass or nodule palpated on my exam today, no arm swelling   Neurologic: alert, speech is clear, nonfocal  Psychiatric: Mood and affect appropriate    Relevant labs:    WBC -   Lab Results   Component Value Date    WBC 5.2 09/14/2020       HgB -   Lab Results   Component Value Date    HGB 13.7 09/14/2020       Plt-   Lab Results   Component Value Date     09/14/2020       Liver Function Studies -   Recent Labs   Lab Test 11/09/16  1332   PROTTOTAL 6.9   ALBUMIN 3.5   BILITOTAL 0.3   ALKPHOS 48   AST 10   ALT 14       Lipase- No results found for: LIPASE        Imaging:  All imaging studies reviewed by me.    Recent Results (from the past 744 hour(s))   US Lower Extremity Non Vascular Right    Addendum: 6/30/2023    Razia Almeida  Accession: #[id number]    Original report dictated by Dr. Taveras. Addendum dictated by Dr. Walters.    Highly echogenic focus medial to the vessels in the right groin, with  posterior acoustical shadowing. This corresponds to the PET  abnormality " and is consistent with mesh material from previous  herniorrhaphy. No biopsy indicated. The small lymph nodes noted on  this ultrasound do not correspond to the abnormal PET activity.    Antwon Walters MD   Date of Addendum: 6/30/2023.    ANTWON WALTERS MD         SYSTEM ID:  [id number]      Narrative    ULTRASOUND LOWER EXTREMITY NONVASCULAR RIGHT   6/28/2023 9:00 AM    CLINICAL HISTORY: 57-year-old with history of malignant melanoma, with  inguinal lymphadenopathy. Prebiopsy assessment for  feasibility/visualization.    COMPARISON: PET/CT 5/18/2023.    TECHNIQUE: Grayscale and color Doppler sonographic images were  obtained of the right and left groins.    FINDINGS:     In the right groin, there is a superficial inguinal lymph node  identified, measuring 14 x 8 x 4 mm. This has a preserved fatty hilum.  The cortex is normal thickness.    In the left groin, there is a superficial soft tissue nodule  identified, measuring 8 x 4 x 3 mm. Fatty hilum is less well defined  than typically seen in a lymph node, although there is a vascular  pedicle/hilum visualized. Cortex is normal thickness.      Impression    IMPRESSION:    1.  Sonographically normal lymph node in the right inguinal station,  measuring 14 x 8 x 4 mm.    2.  Small soft tissue nodule in the left inguinal region, measuring 8  x 4 x 3 mm. This is likely a lymph node, with a vascular pedicle and  what would appear to be a normal thickness cortex. However, echogenic  fatty hilum is not as well visualized as typically seen.    3.  Both of these would be amenable to percutaneous needle sampling.     STEFANY MATHUR MD         SYSTEM ID:  [id number]   US Head Neck Soft Tissue    Narrative    US HEAD NECK SOFT TISSUE   6/28/2023 9:00 AM     HISTORY: Malignant melanoma of skin (H).    COMPARISON: Neck CTA on 7/12/2017.      Impression    IMPRESSION:  Limited soft tissue ultrasound of the neck demonstrates  approximately 0.8 x 0.8 x 0.6 cm hypoechoic nodule  in the right neck  area which corresponds to the area of lump, could represent abnormal  cervical lymph node (given the lack of fatty hilum) versus other soft  tissue nodules.     VICTOR HUGO GREGG MD         SYSTEM ID:  [id number]   MA Diagnostic Bilateral w/Philip    Narrative    DIAGNOSTIC MAMMOGRAM, BILATERAL, DIGITAL w/CAD AND TOMOSYNTHESIS -  6/28/2023 3:31 PM  ULTRASOUND LEFT AXILLA    HISTORY:  Left axillary lump.     COMPARISON:  Prior mammograms in 2020 and 2018.     BREAST DENSITY: Heterogeneously dense.    FINDINGS:  No mammographic findings suspicious for malignancy.    Targeted ultrasound evaluation of the left axilla at the site of  palpable lump demonstrates a circumscribed oval hypoechoic mass just  below the skin measuring 5 x 2 x 5 mm.       Impression    IMPRESSION: BI-RADS CATEGORY: 4 - Suspicious Abnormality-Biopsy Should  Be Considered.    RECOMMENDED FOLLOW-UP: Biopsy.   The patient states she is considering surgical excision. Histology  using ultrasound-guided core needle biopsy with clip placement could  be performed if clinically warranted.     The results and recommendation were communicated to the patient at the  conclusion of today's appointment.    JUANITO LOWERY MD         SYSTEM ID:  [id number]     US Axillary Left    Narrative    DIAGNOSTIC MAMMOGRAM, BILATERAL, DIGITAL w/CAD AND TOMOSYNTHESIS -  6/28/2023 3:31 PM  ULTRASOUND LEFT AXILLA    HISTORY:  Left axillary lump.     COMPARISON:  Prior mammograms in 2020 and 2018.     BREAST DENSITY: Heterogeneously dense.    FINDINGS:  No mammographic findings suspicious for malignancy.    Targeted ultrasound evaluation of the left axilla at the site of  palpable lump demonstrates a circumscribed oval hypoechoic mass just  below the skin measuring 5 x 2 x 5 mm.       Impression    IMPRESSION: BI-RADS CATEGORY: 4 - Suspicious Abnormality-Biopsy Should  Be Considered.    RECOMMENDED FOLLOW-UP: Biopsy.   The patient states she is considering surgical  excision. Histology  using ultrasound-guided core needle biopsy with clip placement could  be performed if clinically warranted.     The results and recommendation were communicated to the patient at the  conclusion of today's appointment.    JUANITO LOWERY MD         SYSTEM ID:  [id number]     US Head Neck Soft Tissue    Narrative    [facility]  LOCATION: [facility]  DATE: 7/10/2023    PROCEDURE:   1.  FINE-NEEDLE ASPIRATE BIOPSY RIGHT NECK MASS (ABORTED)    INTERVENTIONAL RADIOLOGIST: Case Benedict MD    INDICATION: Palpable right inferior cervical soft tissue nodule.  Patient describes occasional shooting pain when manually manipulating  the lesion.    COMPLICATIONS: No immediate complications.    PROCEDURE:   Preliminary ultrasound confirmed a hypoechoic circumscribed soft  tissue nodule near base of the right neck correlating with that  palpable nodule as well as that seen on prior ultrasound. Ultrasound  images were obtained an place in the patient's permanent medical  record. The skin was prepped and draped in usual sterile fashion  followed by local anesthesia with 1% Xylocaine. Using real-time  ultrasound for needle placement, a 25-gauge needle was advanced into  the nodule. This is associated with excruciating shooting pain.  Despite additional local anesthesia, with needle engagement into the  nodule, severe pain was again encountered. This was not tolerable and  FNA specimens were now able to be obtained.    FINDINGS:   Hypoechoic soft tissue nodule base of right neck. Unable to obtain FNA  specimens due to severe pain with needle within the nodule itself.  Pain was severe and radiating suggesting neurogenic mass.      Impression    IMPRESSION:    Aborted FNA of right neck soft tissue nodule. Severe radiating pain  encountered with needle within the nodule itself suggesting possible  neurogenic mass. Consider surgical consultation.    CASE BENEDICT MD         SYSTEM ID:   [id number]       60 minutes spent on the date of the encounter doing chart review, history and exam, documentation and further activities as noted above      Samuel Thompson MD  [facility] [place]    Please route or send letter to:  Primary Care Provider (PCP)

## 2023-07-13 ENCOUNTER — TELEPHONE (OUTPATIENT)
Dept: MAMMOGRAPHY | Facility: CLINIC | Age: 58
End: 2023-07-13
Payer: COMMERCIAL

## 2023-07-13 NOTE — TELEPHONE ENCOUNTER
Patient calling in wishing to cancel today's biopsy.  She met yesterday with Dr Thompson.   Area is no longer palpable by patient or Dr Thompson.   Patient feels this biopsy was scheduled for her peace of mind and since PET was negative and area is no longer felt, she has chosen to watch area and call if she notes change.     I have offered follow-up ultrasound to see if  the area is visible and she has declined this at this time.      Provided patient with my contact information if she need assistance in the future.

## 2023-08-02 ENCOUNTER — HOSPITAL ENCOUNTER (OUTPATIENT)
Dept: ULTRASOUND IMAGING | Facility: CLINIC | Age: 58
Discharge: HOME | End: 2023-08-02
Attending: INTERNAL MEDICINE | Admitting: INTERNAL MEDICINE
Payer: COMMERCIAL

## 2023-08-02 VITALS
DIASTOLIC BLOOD PRESSURE: 91 MMHG | HEART RATE: 82 BPM | RESPIRATION RATE: 16 BRPM | OXYGEN SATURATION: 100 % | SYSTOLIC BLOOD PRESSURE: 116 MMHG

## 2023-08-02 DIAGNOSIS — C77.9 METASTATIC MELANOMA TO LYMPH NODE (H): Primary | ICD-10-CM

## 2023-08-02 DIAGNOSIS — C43.9 MALIGNANT MELANOMA, UNSPECIFIED SITE (H): ICD-10-CM

## 2023-08-02 PROCEDURE — 88173 CYTOPATH EVAL FNA REPORT: CPT | Mod: 26 | Performed by: PATHOLOGY

## 2023-08-02 PROCEDURE — 88173 CYTOPATH EVAL FNA REPORT: CPT | Mod: TC | Performed by: INTERNAL MEDICINE

## 2023-08-02 PROCEDURE — 250N000011 HC RX IP 250 OP 636: Performed by: PHYSICIAN ASSISTANT

## 2023-08-02 PROCEDURE — 272N000431 US BIOPSY FINE NEEDLE ASPIRATION LYMPH NODE

## 2023-08-02 PROCEDURE — 250N000009 HC RX 250: Performed by: PHYSICIAN ASSISTANT

## 2023-08-02 RX ORDER — FLUMAZENIL 0.1 MG/ML
0.2 INJECTION, SOLUTION INTRAVENOUS
Status: DISCONTINUED | OUTPATIENT
Start: 2023-08-02 | End: 2023-08-03 | Stop reason: HOSPADM

## 2023-08-02 RX ORDER — NALOXONE HYDROCHLORIDE 0.4 MG/ML
0.2 INJECTION, SOLUTION INTRAMUSCULAR; INTRAVENOUS; SUBCUTANEOUS
Status: DISCONTINUED | OUTPATIENT
Start: 2023-08-02 | End: 2023-08-03 | Stop reason: HOSPADM

## 2023-08-02 RX ORDER — NALOXONE HYDROCHLORIDE 0.4 MG/ML
0.4 INJECTION, SOLUTION INTRAMUSCULAR; INTRAVENOUS; SUBCUTANEOUS
Status: DISCONTINUED | OUTPATIENT
Start: 2023-08-02 | End: 2023-08-03 | Stop reason: HOSPADM

## 2023-08-02 RX ORDER — FENTANYL CITRATE 50 UG/ML
25-50 INJECTION, SOLUTION INTRAMUSCULAR; INTRAVENOUS EVERY 5 MIN PRN
Status: DISCONTINUED | OUTPATIENT
Start: 2023-08-02 | End: 2023-08-03 | Stop reason: HOSPADM

## 2023-08-02 RX ADMIN — LIDOCAINE HYDROCHLORIDE 10 ML: 10 INJECTION, SOLUTION EPIDURAL; INFILTRATION; INTRACAUDAL; PERINEURAL at 09:53

## 2023-08-02 RX ADMIN — FENTANYL CITRATE 50 MCG: 50 INJECTION, SOLUTION INTRAMUSCULAR; INTRAVENOUS at 09:36

## 2023-08-02 RX ADMIN — FENTANYL CITRATE 50 MCG: 50 INJECTION, SOLUTION INTRAMUSCULAR; INTRAVENOUS at 09:44

## 2023-08-02 RX ADMIN — FENTANYL CITRATE 50 MCG: 50 INJECTION, SOLUTION INTRAMUSCULAR; INTRAVENOUS at 09:40

## 2023-08-02 RX ADMIN — FENTANYL CITRATE 50 MCG: 50 INJECTION, SOLUTION INTRAMUSCULAR; INTRAVENOUS at 09:58

## 2023-08-02 RX ADMIN — MIDAZOLAM 1 MG: 1 INJECTION INTRAMUSCULAR; INTRAVENOUS at 09:36

## 2023-08-02 RX ADMIN — MIDAZOLAM 1 MG: 1 INJECTION INTRAMUSCULAR; INTRAVENOUS at 09:40

## 2023-08-02 RX ADMIN — MIDAZOLAM 1 MG: 1 INJECTION INTRAMUSCULAR; INTRAVENOUS at 09:57

## 2023-08-02 RX ADMIN — MIDAZOLAM 1 MG: 1 INJECTION INTRAMUSCULAR; INTRAVENOUS at 09:44

## 2023-08-02 NOTE — PROGRESS NOTES
Pt here for right neck lymph node biopsy.  Consent and procedure completed by Dr Hussein.  Sedation given 4 mg versed and 200 mcg of fentanyl given 30 minutes sedation time.  VSS.  Bandaid applied and remained clean dry and intact through time of discharge.  Recovered one hour.  Discharge instructions given and understood by patient.  Discharge with daughter no complications noted. Samples where prepared on slides and brought to lab for sampling.  Patient only able to tolerate FNA sampling due to pain.

## 2023-08-03 LAB
PATH REPORT.COMMENTS IMP SPEC: NORMAL
PATH REPORT.COMMENTS IMP SPEC: NORMAL
PATH REPORT.FINAL DX SPEC: NORMAL
PATH REPORT.GROSS SPEC: NORMAL
PATH REPORT.MICROSCOPIC SPEC OTHER STN: NORMAL
PATH REPORT.RELEVANT HX SPEC: NORMAL

## 2023-08-26 ENCOUNTER — HEALTH MAINTENANCE LETTER (OUTPATIENT)
Age: 58
End: 2023-08-26

## 2024-06-17 PROBLEM — Z76.89 HEALTH CARE HOME: Status: RESOLVED | Noted: 2020-09-14 | Resolved: 2024-06-17

## 2024-10-19 ENCOUNTER — HEALTH MAINTENANCE LETTER (OUTPATIENT)
Age: 59
End: 2024-10-19

## 2025-07-30 ENCOUNTER — HOSPITAL ENCOUNTER (OUTPATIENT)
Facility: CLINIC | Age: 60
Setting detail: OBSERVATION
Discharge: HOME | End: 2025-07-31
Attending: EMERGENCY MEDICINE | Admitting: INTERNAL MEDICINE
Payer: COMMERCIAL

## 2025-07-30 ENCOUNTER — APPOINTMENT (OUTPATIENT)
Dept: CT IMAGING | Facility: CLINIC | Age: 60
End: 2025-07-30
Attending: EMERGENCY MEDICINE
Payer: COMMERCIAL

## 2025-07-30 ENCOUNTER — APPOINTMENT (OUTPATIENT)
Dept: MRI IMAGING | Facility: CLINIC | Age: 60
End: 2025-07-30
Attending: EMERGENCY MEDICINE
Payer: COMMERCIAL

## 2025-07-30 DIAGNOSIS — R29.810 FACIAL DROOP: Primary | ICD-10-CM

## 2025-07-30 LAB
ANION GAP SERPL CALCULATED.3IONS-SCNC: 13 MMOL/L (ref 7–15)
BUN SERPL-MCNC: 15.9 MG/DL (ref 6–20)
CALCIUM SERPL-MCNC: 9.2 MG/DL (ref 8.8–10.4)
CHLORIDE SERPL-SCNC: 101 MMOL/L (ref 98–107)
CREAT SERPL-MCNC: 0.68 MG/DL (ref 0.51–0.95)
EGFRCR SERPLBLD CKD-EPI 2021: >90 ML/MIN/1.73M2
ERYTHROCYTE [DISTWIDTH] IN BLOOD BY AUTOMATED COUNT: 12.8 % (ref 10–15)
EST. AVERAGE GLUCOSE BLD GHB EST-MCNC: 97 MG/DL
GLUCOSE BLDC GLUCOMTR-MCNC: 118 MG/DL (ref 70–99)
GLUCOSE BLDC GLUCOMTR-MCNC: 125 MG/DL (ref 70–99)
GLUCOSE SERPL-MCNC: 171 MG/DL (ref 70–99)
HBA1C MFR BLD: 5 %
HCO3 SERPL-SCNC: 25 MMOL/L (ref 22–29)
HCT VFR BLD AUTO: 39.4 % (ref 35–47)
HGB BLD-MCNC: 13 G/DL (ref 11.7–15.7)
HOLD SPECIMEN: NORMAL
HOLD SPECIMEN: NORMAL
MCH RBC QN AUTO: 28.3 PG (ref 26.5–33)
MCHC RBC AUTO-ENTMCNC: 33 G/DL (ref 31.5–36.5)
MCV RBC AUTO: 86 FL (ref 78–100)
PLATELET # BLD AUTO: 260 10E3/UL (ref 150–450)
POTASSIUM SERPL-SCNC: 3.9 MMOL/L (ref 3.4–5.3)
RBC # BLD AUTO: 4.6 10E6/UL (ref 3.8–5.2)
SODIUM SERPL-SCNC: 139 MMOL/L (ref 135–145)
WBC # BLD AUTO: 7.5 10E3/UL (ref 4–11)

## 2025-07-30 PROCEDURE — 83036 HEMOGLOBIN GLYCOSYLATED A1C: CPT | Performed by: INTERNAL MEDICINE

## 2025-07-30 PROCEDURE — 82465 ASSAY BLD/SERUM CHOLESTEROL: CPT | Performed by: INTERNAL MEDICINE

## 2025-07-30 PROCEDURE — 93005 ELECTROCARDIOGRAM TRACING: CPT

## 2025-07-30 PROCEDURE — 99222 1ST HOSP IP/OBS MODERATE 55: CPT | Performed by: INTERNAL MEDICINE

## 2025-07-30 PROCEDURE — 70496 CT ANGIOGRAPHY HEAD: CPT

## 2025-07-30 PROCEDURE — 70450 CT HEAD/BRAIN W/O DYE: CPT

## 2025-07-30 PROCEDURE — 250N000013 HC RX MED GY IP 250 OP 250 PS 637: Performed by: EMERGENCY MEDICINE

## 2025-07-30 PROCEDURE — 250N000013 HC RX MED GY IP 250 OP 250 PS 637: Performed by: INTERNAL MEDICINE

## 2025-07-30 PROCEDURE — 255N000002 HC RX 255 OP 636: Performed by: EMERGENCY MEDICINE

## 2025-07-30 PROCEDURE — 36415 COLL VENOUS BLD VENIPUNCTURE: CPT | Performed by: EMERGENCY MEDICINE

## 2025-07-30 PROCEDURE — 82962 GLUCOSE BLOOD TEST: CPT

## 2025-07-30 PROCEDURE — 99285 EMERGENCY DEPT VISIT HI MDM: CPT | Mod: 25 | Performed by: EMERGENCY MEDICINE

## 2025-07-30 PROCEDURE — 250N000009 HC RX 250: Performed by: EMERGENCY MEDICINE

## 2025-07-30 PROCEDURE — A9585 GADOBUTROL INJECTION: HCPCS | Performed by: EMERGENCY MEDICINE

## 2025-07-30 PROCEDURE — 70553 MRI BRAIN STEM W/O & W/DYE: CPT

## 2025-07-30 PROCEDURE — 85014 HEMATOCRIT: CPT | Performed by: EMERGENCY MEDICINE

## 2025-07-30 PROCEDURE — 80048 BASIC METABOLIC PNL TOTAL CA: CPT | Performed by: EMERGENCY MEDICINE

## 2025-07-30 PROCEDURE — 85048 AUTOMATED LEUKOCYTE COUNT: CPT | Performed by: EMERGENCY MEDICINE

## 2025-07-30 PROCEDURE — G0378 HOSPITAL OBSERVATION PER HR: HCPCS

## 2025-07-30 PROCEDURE — 250N000011 HC RX IP 250 OP 636: Performed by: EMERGENCY MEDICINE

## 2025-07-30 RX ORDER — DEXTROAMPHETAMINE SACCHARATE, AMPHETAMINE ASPARTATE MONOHYDRATE, DEXTROAMPHETAMINE SULFATE AND AMPHETAMINE SULFATE 6.25; 6.25; 6.25; 6.25 MG/1; MG/1; MG/1; MG/1
25 CAPSULE, EXTENDED RELEASE ORAL EVERY MORNING
Refills: 0 | Status: DISCONTINUED | OUTPATIENT
Start: 2025-07-31 | End: 2025-07-30

## 2025-07-30 RX ORDER — DEXTROAMPHETAMINE SACCHARATE, AMPHETAMINE ASPARTATE MONOHYDRATE, DEXTROAMPHETAMINE SULFATE AND AMPHETAMINE SULFATE 6.25; 6.25; 6.25; 6.25 MG/1; MG/1; MG/1; MG/1
25 CAPSULE, EXTENDED RELEASE ORAL EVERY MORNING
COMMUNITY

## 2025-07-30 RX ORDER — ATORVASTATIN CALCIUM 10 MG/1
10 TABLET, FILM COATED ORAL EVERY EVENING
Status: DISCONTINUED | OUTPATIENT
Start: 2025-07-30 | End: 2025-07-31 | Stop reason: HOSPADM

## 2025-07-30 RX ORDER — SUMATRIPTAN 50 MG/1
100 TABLET, FILM COATED ORAL
Status: DISCONTINUED | OUTPATIENT
Start: 2025-07-30 | End: 2025-07-31 | Stop reason: HOSPADM

## 2025-07-30 RX ORDER — ASPIRIN 325 MG
325 TABLET ORAL ONCE
Status: COMPLETED | OUTPATIENT
Start: 2025-07-30 | End: 2025-07-30

## 2025-07-30 RX ORDER — IOPAMIDOL 755 MG/ML
500 INJECTION, SOLUTION INTRAVASCULAR ONCE
Status: COMPLETED | OUTPATIENT
Start: 2025-07-30 | End: 2025-07-30

## 2025-07-30 RX ORDER — ZOLPIDEM TARTRATE 10 MG/1
5 TABLET ORAL
COMMUNITY

## 2025-07-30 RX ORDER — ESTRADIOL 1 MG/1
1 TABLET ORAL DAILY
COMMUNITY

## 2025-07-30 RX ORDER — GABAPENTIN 300 MG/1
300 CAPSULE ORAL 2 TIMES DAILY PRN
COMMUNITY

## 2025-07-30 RX ORDER — GADOBUTROL 604.72 MG/ML
6 INJECTION INTRAVENOUS ONCE
Status: COMPLETED | OUTPATIENT
Start: 2025-07-30 | End: 2025-07-30

## 2025-07-30 RX ORDER — DEXTROAMPHETAMINE SACCHARATE, AMPHETAMINE ASPARTATE MONOHYDRATE, DEXTROAMPHETAMINE SULFATE AND AMPHETAMINE SULFATE 5; 5; 5; 5 MG/1; MG/1; MG/1; MG/1
20 CAPSULE, EXTENDED RELEASE ORAL DAILY
Refills: 0 | Status: DISCONTINUED | OUTPATIENT
Start: 2025-07-31 | End: 2025-07-31 | Stop reason: HOSPADM

## 2025-07-30 RX ORDER — DEXTROAMPHETAMINE SACCHARATE, AMPHETAMINE ASPARTATE MONOHYDRATE, DEXTROAMPHETAMINE SULFATE AND AMPHETAMINE SULFATE 1.25; 1.25; 1.25; 1.25 MG/1; MG/1; MG/1; MG/1
5 CAPSULE, EXTENDED RELEASE ORAL DAILY
Refills: 0 | Status: DISCONTINUED | OUTPATIENT
Start: 2025-07-31 | End: 2025-07-31 | Stop reason: HOSPADM

## 2025-07-30 RX ORDER — ZOLPIDEM TARTRATE 5 MG/1
5 TABLET ORAL
Status: DISCONTINUED | OUTPATIENT
Start: 2025-07-30 | End: 2025-07-31 | Stop reason: HOSPADM

## 2025-07-30 RX ORDER — HYDROCODONE BITARTRATE AND ACETAMINOPHEN 5; 325 MG/1; MG/1
1 TABLET ORAL EVERY 8 HOURS PRN
COMMUNITY

## 2025-07-30 RX ORDER — CLOPIDOGREL BISULFATE 75 MG/1
75 TABLET ORAL DAILY
Status: DISCONTINUED | OUTPATIENT
Start: 2025-07-31 | End: 2025-07-31

## 2025-07-30 RX ORDER — BUPROPION HYDROCHLORIDE 150 MG/1
150 TABLET ORAL DAILY
Status: DISCONTINUED | OUTPATIENT
Start: 2025-07-31 | End: 2025-07-31 | Stop reason: HOSPADM

## 2025-07-30 RX ORDER — NALOXONE HYDROCHLORIDE 0.4 MG/ML
0.2 INJECTION, SOLUTION INTRAMUSCULAR; INTRAVENOUS; SUBCUTANEOUS
Status: DISCONTINUED | OUTPATIENT
Start: 2025-07-30 | End: 2025-07-31 | Stop reason: HOSPADM

## 2025-07-30 RX ORDER — NALOXONE HYDROCHLORIDE 0.4 MG/ML
0.4 INJECTION, SOLUTION INTRAMUSCULAR; INTRAVENOUS; SUBCUTANEOUS
Status: DISCONTINUED | OUTPATIENT
Start: 2025-07-30 | End: 2025-07-31 | Stop reason: HOSPADM

## 2025-07-30 RX ORDER — ATORVASTATIN CALCIUM 10 MG/1
10 TABLET, FILM COATED ORAL DAILY
COMMUNITY

## 2025-07-30 RX ORDER — CHLORAL HYDRATE 500 MG
1 CAPSULE ORAL 2 TIMES DAILY
COMMUNITY

## 2025-07-30 RX ORDER — CELECOXIB 200 MG/1
200 CAPSULE ORAL DAILY
Status: DISCONTINUED | OUTPATIENT
Start: 2025-07-31 | End: 2025-07-31 | Stop reason: HOSPADM

## 2025-07-30 RX ORDER — ASPIRIN 81 MG/1
81 TABLET, CHEWABLE ORAL DAILY
Status: DISCONTINUED | OUTPATIENT
Start: 2025-07-31 | End: 2025-07-31 | Stop reason: HOSPADM

## 2025-07-30 RX ORDER — HYDROCODONE BITARTRATE AND ACETAMINOPHEN 5; 325 MG/1; MG/1
1 TABLET ORAL EVERY 8 HOURS PRN
Refills: 0 | Status: DISCONTINUED | OUTPATIENT
Start: 2025-07-30 | End: 2025-07-31 | Stop reason: HOSPADM

## 2025-07-30 RX ORDER — CLOPIDOGREL BISULFATE 75 MG/1
300 TABLET ORAL ONCE
Status: COMPLETED | OUTPATIENT
Start: 2025-07-30 | End: 2025-07-30

## 2025-07-30 RX ADMIN — GADOBUTROL 6 ML: 604.72 INJECTION INTRAVENOUS at 18:19

## 2025-07-30 RX ADMIN — SODIUM CHLORIDE 80 ML: 9 INJECTION, SOLUTION INTRAVENOUS at 18:02

## 2025-07-30 RX ADMIN — ASPIRIN 325 MG ORAL TABLET 325 MG: 325 PILL ORAL at 20:52

## 2025-07-30 RX ADMIN — CLOPIDOGREL BISULFATE 300 MG: 75 TABLET, FILM COATED ORAL at 20:52

## 2025-07-30 RX ADMIN — IOPAMIDOL 67 ML: 755 INJECTION, SOLUTION INTRAVENOUS at 18:02

## 2025-07-30 RX ADMIN — ATORVASTATIN CALCIUM 10 MG: 10 TABLET, FILM COATED ORAL at 23:58

## 2025-07-30 ASSESSMENT — ACTIVITIES OF DAILY LIVING (ADL)
ADLS_ACUITY_SCORE: 41

## 2025-07-30 ASSESSMENT — COLUMBIA-SUICIDE SEVERITY RATING SCALE - C-SSRS
2. HAVE YOU ACTUALLY HAD ANY THOUGHTS OF KILLING YOURSELF IN THE PAST MONTH?: NO
1. IN THE PAST MONTH, HAVE YOU WISHED YOU WERE DEAD OR WISHED YOU COULD GO TO SLEEP AND NOT WAKE UP?: NO
6. HAVE YOU EVER DONE ANYTHING, STARTED TO DO ANYTHING, OR PREPARED TO DO ANYTHING TO END YOUR LIFE?: NO

## 2025-07-30 NOTE — CONSULTS
[facility]    Stroke Telephone Note    I was called by Khurram Negron on 07/30/25 regarding patient Razia Almeida. 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 As per ED doc no neurological signs on exam. Patient went to the dentist in the morning but did not get any injections or dental blocks. Was sent to the ER from urgent care. Blood pressure 153 systolic. I asked him to get CT head and CTA. Patient was waiting to get an MRI and MRA head and neck, which will take time. Hence asked them to get CT head and CTA stat and keep the order for MRI.    Vitals  BP: (!) 153/100   Pulse: 92   Resp: 16   Temp: 98.1  F (36.7  C)   Weight: 60.9 kg (134 lb 4.2 oz)    Imaging Findings on personal review  HEAD CT:  1.  No acute intracranial abnormality.     HEAD CTA:   1.  New since 7/12/2017, marked luminal irregularity and high-grade long-segment stenosis involving the V3/V4 junction and V4 segment of the right vertebral artery, most consistent with the sequela of age-indeterminate dissection.  2.  Patent remainder of the [place] without high-grade stenosis.  3.  No aneurysm or high-flow vascular malformation.     NECK CTA:  1.  New since 2017, diffusely small right vertebral artery caliber may be due to high-grade downstream stenosis versus age-indeterminate dissection. No high-grade stenosis, pseudoaneurysm, or intimal flap.  2.  Unchanged fusiform dilation involving the distal cervical left ICA with a 3 x 2 mm superimposed focal outpouching, most consistent with chronic dissection/pseudoaneurysm.  3.  Patent bilateral carotid and left vertebral arteries without hemodynamically  significant stenosis.    MRI:  1.  No acute intracranial abnormality.  2.  Left maxillary sinus mucosal thickening with suggestion of layering secretions. Correlate clinically to exclude acute sinusitis.    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  CT scan of the head and CT angiogram did not show any acute intracranial pathology or any large vessel occlusion but did show high-grade long-segment stenosis involving the V3/V4 junction and V4 segment of the right vertebral artery, most consistent with the sequela of age-indeterminate dissection,diffusely small right vertebral artery caliber may be due to high-grade downstream stenosis versus age-indeterminate dissection and  Unchanged fusiform dilation involving the distal cervical left ICA with a 3 x 2 mm superimposed focal outpouching, most consistent with chronic dissection/pseudoaneurysm. MRI brain did not show acute stroke. Patient's ABCD 2 score is 3 with a 90-day stroke risk of 3.1%.  In the setting of history of metastatic melanoma and ABCD2 score of 3 and vascular findings on CTA we think it will be a good idea to admit the patient for TIA workup.  We would recommend giving the patient a full dose of aspirin 325 mg and loading dose of Plavix 300 mg.  Start the patient on aspirin Plavix 81 mg and 75 mg respectively starting tomorrow.  Kindly call us with any questions/concerns.      Recommendations  - Neurochecks and Vital Signs every 2 hrs  - Load patient with Plavix 300 mg and give the patient a full dose aspirin 325 mg X1 time.  - Daily aspirin 81 mg for secondary stroke prevention  - Plavix (clopidogrel) 75 mg PO  "Daily  - Statin: Continue home atorvastatin 10 mg, will titrate once LDL levels back.  - MRI Brain with and without contrast- completed  - TTE (with Bubble Study if age 60 yrs or less)  - 24-hour Telemetry  - Bedside Glucose Monitoring  - A1c, Lipid Panel  - PT/OT/SLP  - Stroke Education  - [place]mia, Euglycemia     The Stroke Staff is EDY Haines .    LESLIE Mohan  Vascular Neurology Fellow    To page me or covering stroke neurology team member, click here: [email or web address]  Choose \"On Call\" tab at top, then select \"NEUROLOGY/ALL SITES\" from middle drop-down box, press Enter, then look for \"stroke\" or \"telestroke\" for your site.    "

## 2025-07-30 NOTE — ED TRIAGE NOTES
Left sided facial droop while face is at rest. Smile even, able to raise eye brows equally. BEFAST otherwise negative. Patient reports that she did not notice facial droop this morning at 7am while applying makeup. Stroke eval called. Dr. Negron to triage for eval, no stroke code.    Minocycline Counseling: Patient advised regarding possible photosensitivity and discoloration of the teeth, skin, lips, tongue and gums.  Patient instructed to avoid sunlight, if possible.  When exposed to sunlight, patients should wear protective clothing, sunglasses, and sunscreen.  The patient was instructed to call the office immediately if the following severe adverse effects occur:  hearing changes, easy bruising/bleeding, severe headache, or vision changes.  The patient verbalized understanding of the proper use and possible adverse effects of minocycline.  All of the patient's questions and concerns were addressed. Benzoyl Peroxide Pregnancy And Lactation Text: This medication is Pregnancy Category C. It is unknown if benzoyl peroxide is excreted in breast milk. Dapsone Pregnancy And Lactation Text: This medication is Pregnancy Category C and is not considered safe during pregnancy or breast feeding. Spironolactone Counseling: Patient advised regarding risks of diarrhea, abdominal pain, hyperkalemia, birth defects (for female patients), liver toxicity and renal toxicity. The patient may need blood work to monitor liver and kidney function and potassium levels while on therapy. The patient verbalized understanding of the proper use and possible adverse effects of spironolactone.  All of the patient's questions and concerns were addressed. Bactrim Counseling:  I discussed with the patient the risks of sulfa antibiotics including but not limited to GI upset, allergic reaction, drug rash, diarrhea, dizziness, photosensitivity, and yeast infections.  Rarely, more serious reactions can occur including but not limited to aplastic anemia, agranulocytosis, methemoglobinemia, blood dyscrasias, liver or kidney failure, lung infiltrates or desquamative/blistering drug rashes. Tazorac Pregnancy And Lactation Text: This medication is not safe during pregnancy. It is unknown if this medication is excreted in breast milk. Erythromycin Pregnancy And Lactation Text: This medication is Pregnancy Category B and is considered safe during pregnancy. It is also excreted in breast milk. Topical Sulfur Applications Pregnancy And Lactation Text: This medication is Pregnancy Category C and has an unknown safety profile during pregnancy. It is unknown if this topical medication is excreted in breast milk. High Dose Vitamin A Pregnancy And Lactation Text: High dose vitamin A therapy is contraindicated during pregnancy and breast feeding. Benzoyl Peroxide Counseling: Patient counseled that medicine may cause skin irritation and bleach clothing.  In the event of skin irritation, the patient was advised to reduce the amount of the drug applied or use it less frequently.   The patient verbalized understanding of the proper use and possible adverse effects of benzoyl peroxide.  All of the patient's questions and concerns were addressed. Dapsone Counseling: I discussed with the patient the risks of dapsone including but not limited to hemolytic anemia, agranulocytosis, rashes, methemoglobinemia, kidney failure, peripheral neuropathy, headaches, GI upset, and liver toxicity.  Patients who start dapsone require monitoring including baseline LFTs and weekly CBCs for the first month, then every month thereafter.  The patient verbalized understanding of the proper use and possible adverse effects of dapsone.  All of the patient's questions and concerns were addressed. Azithromycin Pregnancy And Lactation Text: This medication is considered safe during pregnancy and is also secreted in breast milk. Tazorac Counseling:  Patient advised that medication is irritating and drying.  Patient may need to apply sparingly and wash off after an hour before eventually leaving it on overnight.  The patient verbalized understanding of the proper use and possible adverse effects of tazorac.  All of the patient's questions and concerns were addressed. Erythromycin Counseling:  I discussed with the patient the risks of erythromycin including but not limited to GI upset, allergic reaction, drug rash, diarrhea, increase in liver enzymes, and yeast infections. Sarecycline Pregnancy And Lactation Text: This medication is Pregnancy Category D and not consider safe during pregnancy. It is also excreted in breast milk. Topical Sulfur Applications Counseling: Topical Sulfur Counseling: Patient counseled that this medication may cause skin irritation or allergic reactions.  In the event of skin irritation, the patient was advised to reduce the amount of the drug applied or use it less frequently.   The patient verbalized understanding of the proper use and possible adverse effects of topical sulfur application.  All of the patient's questions and concerns were addressed. High Dose Vitamin A Counseling: Side effects reviewed, pt to contact office should one occur. Birth Control Pills Pregnancy And Lactation Text: This medication should be avoided if pregnant and for the first 30 days post-partum. Topical Retinoid Pregnancy And Lactation Text: This medication is Pregnancy Category C. It is unknown if this medication is excreted in breast milk. Detail Level: Zone Azithromycin Counseling:  I discussed with the patient the risks of azithromycin including but not limited to GI upset, allergic reaction, drug rash, diarrhea, and yeast infections. Doxycycline Pregnancy And Lactation Text: This medication is Pregnancy Category D and not consider safe during pregnancy. It is also excreted in breast milk but is considered safe for shorter treatment courses. Sarecycline Counseling: Patient advised regarding possible photosensitivity and discoloration of the teeth, skin, lips, tongue and gums.  Patient instructed to avoid sunlight, if possible.  When exposed to sunlight, patients should wear protective clothing, sunglasses, and sunscreen.  The patient was instructed to call the office immediately if the following severe adverse effects occur:  hearing changes, easy bruising/bleeding, severe headache, or vision changes.  The patient verbalized understanding of the proper use and possible adverse effects of sarecycline.  All of the patient's questions and concerns were addressed. Isotretinoin Pregnancy And Lactation Text: This medication is Pregnancy Category X and is considered extremely dangerous during pregnancy. It is unknown if it is excreted in breast milk. Tetracycline Counseling: Patient counseled regarding possible photosensitivity and increased risk for sunburn.  Patient instructed to avoid sunlight, if possible.  When exposed to sunlight, patients should wear protective clothing, sunglasses, and sunscreen.  The patient was instructed to call the office immediately if the following severe adverse effects occur:  hearing changes, easy bruising/bleeding, severe headache, or vision changes.  The patient verbalized understanding of the proper use and possible adverse effects of tetracycline.  All of the patient's questions and concerns were addressed. Patient understands to avoid pregnancy while on therapy due to potential birth defects. Birth Control Pills Counseling: Birth Control Pill Counseling: I discussed with the patient the potential side effects of OCPs including but not limited to increased risk of stroke, heart attack, thrombophlebitis, deep venous thrombosis, hepatic adenomas, breast changes, GI upset, headaches, and depression.  The patient verbalized understanding of the proper use and possible adverse effects of OCPs. All of the patient's questions and concerns were addressed. Topical Clindamycin Pregnancy And Lactation Text: This medication is Pregnancy Category B and is considered safe during pregnancy. It is unknown if it is excreted in breast milk. Topical Retinoid counseling:  Patient advised to apply a pea-sized amount only at bedtime and wait 30 minutes after washing their face before applying.  If too drying, patient may add a non-comedogenic moisturizer. The patient verbalized understanding of the proper use and possible adverse effects of retinoids.  All of the patient's questions and concerns were addressed. Doxycycline Counseling:  Patient counseled regarding possible photosensitivity and increased risk for sunburn.  Patient instructed to avoid sunlight, if possible.  When exposed to sunlight, patients should wear protective clothing, sunglasses, and sunscreen.  The patient was instructed to call the office immediately if the following severe adverse effects occur:  hearing changes, easy bruising/bleeding, severe headache, or vision changes.  The patient verbalized understanding of the proper use and possible adverse effects of doxycycline.  All of the patient's questions and concerns were addressed. Use Enhanced Medication Counseling?: No Spironolactone Pregnancy And Lactation Text: This medication can cause feminization of the male fetus and should be avoided during pregnancy. The active metabolite is also found in breast milk. Bactrim Pregnancy And Lactation Text: This medication is Pregnancy Category D and is known to cause fetal risk.  It is also excreted in breast milk. Topical Clindamycin Counseling: Patient counseled that this medication may cause skin irritation or allergic reactions.  In the event of skin irritation, the patient was advised to reduce the amount of the drug applied or use it less frequently.   The patient verbalized understanding of the proper use and possible adverse effects of clindamycin.  All of the patient's questions and concerns were addressed. Isotretinoin Counseling: Patient should get monthly blood tests, not donate blood, not drive at night if vision affected, not share medication, and not undergo elective surgery for 6 months after tx completed. Side effects reviewed, pt to contact office should one occur.

## 2025-07-30 NOTE — ED PROVIDER NOTES
"  Emergency Department Note      History of Present Illness     Chief Complaint   Facial Droop      HPI   Razia Almeida is a 59 year old female with history of FMD and right carotid artery dissection/pseudoaneurysm presenting to the ER for evaluation of  provider comments of left facial drooping.  Patient woke up at 6 AM and felt completely normal.  Currently, denies any symptoms of facial heaviness/drooping.  She went to her dentist and eye doctor appointment this morning.  Eye doctor did a dilated eye exam and commented that the patient's left face looked droopy.  Patient feels normal.  They sent her to urgent care and urgent care checked her out and reportedly was normal but sent her to the ER \"out of an abundance of precaution\".  Patient overall feels well Pillard denies any new headache, dizziness, vision loss/double vision, speech disturbance, extremity numbness/tingling, facial heaviness.    She does report she had tick bike \"several weeks ago\".  Tick was not engorged.  No rashes.  No fever, joint aches, fatigue.  She is not on antiplatelet or anticoagulation.    Independent Historian   None    Review of External Notes   None    Past Medical History     Medical History and Problem List   Past Medical History:   Diagnosis Date    Anxiety     Cancer (H)     Carotid artery dissection     Fibromuscular dysplasia     Knee pain, chronic     Migraine headache     Myofascial pain     Neck pain, chronic     Raynaud's disease        Medications   No current outpatient medications on file.      Surgical History   Past Surgical History:   Procedure Laterality Date    ABDOMEN SURGERY      BIOPSY/REMOVAL, LYMPH NODE(S)      EXCISE MASS NECK Right 11/2/2017    Procedure: EXCISE MASS NECK;  Excision Right Posterior Neck Mass;  Surgeon: Alhaji Mccarthy MD;  Location:  OR     KNEE SCOPE, W/LATERAL RELEASE      HYSTERECTOMY      LUMPECTOMY BREAST, DISSECT AXILLARY NODE(S), COMBINED Left 11/4/2016    Procedure: " COMBINED LUMPECTOMY BREAST, DISSECT AXILLARY NODE(S);  Surgeon: Iris Cota MD;  Location: [facility] OR       Physical Exam     Patient Vitals for the past 24 hrs:   BP Temp Temp src Pulse Resp SpO2 Weight   25 2333 (!) 140/99 97.5  F (36.4  C) Oral 75 17 98 % 60.3 kg (132 lb 14.4 oz)   25 2100 (!) 130/94 -- -- 79 18 97 % --   25 1651 (!) 153/100 98.1  F (36.7  C) Temporal 92 16 100 % --   25 1649 -- -- -- -- -- -- 60.9 kg (134 lb 4.2 oz)     Physical Exam  General: Alert, no acute distress  Neuro:    Alert and oriented x 3.    Speech is normal and fluent.   Face is symmetric without droop. CN's II-XII grossly intact. Negative pronator drift. Finger to nose symmetric and grossly normal bilaterally.   Right Arm: Good  strength. 5/5 elbow flexion. 5/5 elbow extension. Sensation intact to light touch.   Left Arm: Good  strength. 5/5 elbow flexion. 5/5 elbow extension. Sensation intact to light touch.   Right Le/5 straight leg raise, 5/5 knee flexion, 5/5 knee extension. Sensation intact to light touch.   Left Le/5 straight leg raise, 5/5 knee flexion, 5/5 knee extension. Sensation intact to light touch.            Romberg and gait: Deferred  HEENT:  Moist mucous membranes.  Posterior oropharynx clear, no exudates.  Conjunctiva normal.   CV:  RRR, no m/r/g, skin warm and well perfused  Pulm:  CTAB, no wheezes/ronchi/rales.  No acute distress, breathing comfortably  GI:  Soft, nontender, nondistended.  No rebound or guarding.   MSK:  Moving all extremities.  No focal areas of edema, erythema, or tenderness  Skin:  WWP, no rashes, no lower extremity edema, skin color normal, no diaphoresis  Psych:  Well-appearing, normal affect, regular speech    Diagnostics     Lab Results   Labs Ordered and Resulted from Time of ED Arrival to Time of ED Departure   BASIC METABOLIC PANEL (LIMITED OCCURRENCES) - Abnormal       Result Value    Sodium 139      Potassium 3.9      Chloride 101       Carbon Dioxide (CO2) 25      Anion Gap 13      Urea Nitrogen 15.9      Creatinine 0.68      GFR Estimate >90      Calcium 9.2      Glucose 171 (*)    GLUCOSE BY METER - Abnormal    GLUCOSE BY METER POCT 125 (*)    CBC WITH PLATELETS (LIMITED OCCURRENCES) - Normal    WBC Count 7.5      RBC Count 4.60      Hemoglobin 13.0      Hematocrit 39.4      MCV 86      MCH 28.3      MCHC 33.0      RDW 12.8      Platelet Count 260     HEMOGLOBIN A1C - Normal    Estimated Average Glucose 97      Hemoglobin A1C 5.0     GLUCOSE MONITOR NURSING POCT   LIPID PROFILE       Imaging   MR Brain w/o & w Contrast   Final Result   IMPRESSION:   1.  No acute intracranial abnormality.   2.  Left maxillary sinus mucosal thickening with suggestion of layering secretions. Correlate clinically to exclude acute sinusitis.      CTA Head Neck with Contrast   Final Result   Addendum (preliminary) 1 of 1   CLINICAL ADDENDUM:    Clinical information in this report has been modified from the previous    version as follows: Additionally, unchanged subtle focal outpouching    arising from the anterolateral aspect of the mid cervical right internal    carotid artery, suspicious for    additional chronic dissection/pseudoaneurysm (series 11 image 278).      END ADDENDUM      Final   IMPRESSION:    HEAD CT:   1.  No acute intracranial abnormality.      HEAD CTA:    1.  New since 7/12/2017, marked luminal irregularity and high-grade long-segment stenosis involving the V3/V4 junction and V4 segment of the right vertebral artery, most consistent with the sequela of age-indeterminate dissection.   2.  Patent remainder of the [place] without high-grade stenosis.   3.  No aneurysm or high-flow vascular malformation.      NECK CTA:   1.  New since 2017, diffusely small right vertebral artery caliber may be due to high-grade downstream stenosis versus age-indeterminate dissection. No high-grade stenosis, pseudoaneurysm, or intimal flap.   2.  Unchanged  fusiform dilation involving the distal cervical left ICA with a 3 x 2 mm superimposed focal outpouching, most consistent with chronic dissection/pseudoaneurysm.   3.  Patent bilateral carotid and left vertebral arteries without hemodynamically significant stenosis.      Head CT w/o contrast   Final Result   Addendum (preliminary) 1 of 1   CLINICAL ADDENDUM:    Clinical information in this report has been modified from the previous    version as follows: Additionally, unchanged subtle focal outpouching    arising from the anterolateral aspect of the mid cervical right internal    carotid artery, suspicious for    additional chronic dissection/pseudoaneurysm (series 11 image 278).      END ADDENDUM      Final   IMPRESSION:    HEAD CT:   1.  No acute intracranial abnormality.      HEAD CTA:    1.  New since 7/12/2017, marked luminal irregularity and high-grade long-segment stenosis involving the V3/V4 junction and V4 segment of the right vertebral artery, most consistent with the sequela of age-indeterminate dissection.   2.  Patent remainder of the [place] without high-grade stenosis.   3.  No aneurysm or high-flow vascular malformation.      NECK CTA:   1.  New since 2017, diffusely small right vertebral artery caliber may be due to high-grade downstream stenosis versus age-indeterminate dissection. No high-grade stenosis, pseudoaneurysm, or intimal flap.   2.  Unchanged fusiform dilation involving the distal cervical left ICA with a 3 x 2 mm superimposed focal outpouching, most consistent with chronic dissection/pseudoaneurysm.   3.  Patent bilateral carotid and left vertebral arteries without hemodynamically significant stenosis.      Echocardiogram Complete    (Results Pending)       EKG   ECG results from 07/30/25   EKG 12-lead, tracing only     Value    Systolic Blood Pressure     Diastolic Blood Pressure     Ventricular Rate 74    Atrial Rate 74    OH Interval 130    QRS Duration 90        QTc  399    P [place] 70    R AXIS 66    T Axis 83    Interpretation ECG      Sinus rhythm  Normal ECG  No previous ECGs available           Independent Interpretation   None    ED Course      Medications Administered   Medications   aspirin (ASA) chewable tablet 81 mg (has no administration in time range)   clopidogrel (PLAVIX) tablet 75 mg (has no administration in time range)   atorvastatin (LIPITOR) tablet 10 mg (10 mg Oral $Given 7/30/25 8810)   buPROPion (WELLBUTRIN XL) 24 hr tablet 150 mg (has no administration in time range)   celecoxib (celeBREX) capsule 200 mg (has no administration in time range)   HYDROcodone-acetaminophen (NORCO) 5-325 MG per tablet 1 tablet (has no administration in time range)   SUMAtriptan (IMITREX) tablet 100 mg (has no administration in time range)   zolpidem (AMBIEN) tablet 5 mg (has no administration in time range)   naloxone (NARCAN) injection 0.2 mg (has no administration in time range)     Or   naloxone (NARCAN) injection 0.4 mg (has no administration in time range)     Or   naloxone (NARCAN) injection 0.2 mg (has no administration in time range)     Or   naloxone (NARCAN) injection 0.4 mg (has no administration in time range)   amphetamine-dextroamphetamine (ADDERALL XR) ER cap 20 mg (has no administration in time range)     And   amphetamine-dextroamphetamine (ADDERALL XR) ER cap 5 mg (has no administration in time range)   iopamidol (ISOVUE-370) solution 500 mL (67 mLs Intravenous $Given 7/30/25 1802)   sodium chloride 0.9 % bag for CT scan flush (80 mLs Intravenous $Given 7/30/25 1802)   gadobutrol (GADAVIST) injection 6 mL (6 mLs Intravenous $Given 7/30/25 1819)   clopidogrel (PLAVIX) tablet 300 mg (300 mg Oral $Given 7/30/25 2052)   aspirin (ASA) tablet 325 mg (325 mg Oral $Given 7/30/25 2052)       Procedures   Procedures     Discussion of Management   Admitting Hospitalist, Dr. Chávez    Stroke neurology, Dr. Wilson    ED Course   ED Course as of 07/31/25 0139   Wed  Jul 30, 2025 1734 I spoke with stroke neurology.  Recommends initial CT head and CTA head/neck to better visualize blood vessels due to reported history FMD.  Will plan to pursue MRI brain after if normal.        Additional Documentation  [employer] Stroke Scale  Exam Interval: Baseline   Score    Level of consciousness: (0)   Alert, keenly responsive    LOC questions: (0)   Answers both questions correctly    LOC commands: (0)   Performs both tasks correctly    Best gaze: (0)   Normal    Visual: (0)   No visual loss    Facial palsy: (0)   Normal symmetrical movements    Motor arm (left): (0)   No drift    Motor arm (right): (0)   No drift    Motor leg (left): (0)   No drift    Motor leg (right): (0)   No drift    Limb ataxia: (0)   Absent    Sensory: (0)   Normal- no sensory loss    Best language: (0)   Normal- no aphasia    Dysarthria: (0)   Normal    Extinction and inattention: (0)   No abnormality        Total Score:  0         Medical Decision Making / Diagnosis     CMS Diagnoses: None    MIPS   None               MDM   Razia Almeida is a 59 year old female with history of FMD presenting to ER from  for evaluation of left facial droop. Reports she is otherwise feeling well.  Had no concerns for facial droop except when her eye doctor made comment during her routine appointment today.  Denies HA, dizziness, unilateral weakness/numbness, speech disturbance, visual disturbance.  Neuro eval called in triage.  NIHSS 0.  No appreciable facial droop or focal deficits.  No tiered stroke called.  Discussed with stroke neurology, plan for CT/CTA and likely MRI brain w/wo.    CT imaging overall unremarkable.  No LVO on vessel imaging. ? Age indeterminate dissection of RVA, additional stable vascular findings from previous.  MRI brain w/o acute pathology.  Patient remains asymptomatic.  Stroke neuro recommends brief hospitalzation for TIA workup given elevated ABCD2 score.  Patient initiated on  DAPT in ER.  She is agreeable with plan.    Overall, EKG, labs reassuring.  Discussed with hospitalist service who accepted patient for admission.     Disposition   The patient was admitted to the hospital.     Diagnosis     ICD-10-CM    1. Facial droop  R29.810     possible TIA           Discharge Medications   Current Discharge Medication List            MD Migdalia Dior Edgar Ronald, MD  07/31/25 0145

## 2025-07-31 VITALS
RESPIRATION RATE: 16 BRPM | SYSTOLIC BLOOD PRESSURE: 139 MMHG | BODY MASS INDEX: 23.17 KG/M2 | DIASTOLIC BLOOD PRESSURE: 90 MMHG | WEIGHT: 132.9 LBS | HEART RATE: 82 BPM | OXYGEN SATURATION: 100 % | TEMPERATURE: 97.9 F

## 2025-07-31 LAB
ATRIAL RATE - MUSE: 74 BPM
CHOLEST SERPL-MCNC: 157 MG/DL
DIASTOLIC BLOOD PRESSURE - MUSE: NORMAL MMHG
GLUCOSE BLDC GLUCOMTR-MCNC: 96 MG/DL (ref 70–99)
HDLC SERPL-MCNC: 65 MG/DL
INTERPRETATION ECG - MUSE: NORMAL
LDLC SERPL CALC-MCNC: 72 MG/DL
NONHDLC SERPL-MCNC: 92 MG/DL
P AXIS - MUSE: 70 DEGREES
PR INTERVAL - MUSE: 130 MS
QRS DURATION - MUSE: 90 MS
QT - MUSE: 360 MS
QTC - MUSE: 399 MS
R AXIS - MUSE: 66 DEGREES
SYSTOLIC BLOOD PRESSURE - MUSE: NORMAL MMHG
T AXIS - MUSE: 83 DEGREES
TRIGL SERPL-MCNC: 101 MG/DL
VENTRICULAR RATE- MUSE: 74 BPM

## 2025-07-31 PROCEDURE — G0426 INPT/ED TELECONSULT50: HCPCS | Mod: G0 | Performed by: NURSE PRACTITIONER

## 2025-07-31 PROCEDURE — 82962 GLUCOSE BLOOD TEST: CPT

## 2025-07-31 PROCEDURE — G0378 HOSPITAL OBSERVATION PER HR: HCPCS

## 2025-07-31 PROCEDURE — 250N000013 HC RX MED GY IP 250 OP 250 PS 637: Performed by: INTERNAL MEDICINE

## 2025-07-31 PROCEDURE — 99239 HOSP IP/OBS DSCHRG MGMT >30: CPT | Performed by: HOSPITALIST

## 2025-07-31 RX ORDER — ASPIRIN 81 MG/1
81 TABLET ORAL DAILY
Qty: 30 TABLET | Refills: 2 | Status: SHIPPED | OUTPATIENT
Start: 2025-07-31

## 2025-07-31 RX ADMIN — ASPIRIN 81 MG CHEWABLE TABLET 81 MG: 81 TABLET CHEWABLE at 08:01

## 2025-07-31 RX ADMIN — CLOPIDOGREL BISULFATE 75 MG: 75 TABLET, FILM COATED ORAL at 08:01

## 2025-07-31 RX ADMIN — DEXTROAMPHETAMINE SACCHARATE, AMPHETAMINE ASPARTATE MONOHYDRATE, DEXTROAMPHETAMINE SULFATE, AND AMPHETAMINE SULFATE 20 MG: 5; 5; 5; 5 CAPSULE, EXTENDED RELEASE ORAL at 08:02

## 2025-07-31 RX ADMIN — DEXTROAMPHETAMINE SACCHARATE, AMPHETAMINE ASPARTATE MONOHYDRATE, DEXTROAMPHETAMINE SULFATE AND AMPHETAMINE SULFATE 5 MG: 1.25; 1.25; 1.25; 1.25 CAPSULE, EXTENDED RELEASE ORAL at 08:02

## 2025-07-31 RX ADMIN — BUPROPION HYDROCHLORIDE 150 MG: 150 TABLET, EXTENDED RELEASE ORAL at 08:02

## 2025-07-31 RX ADMIN — CELECOXIB 200 MG: 200 CAPSULE ORAL at 08:07

## 2025-07-31 ASSESSMENT — ACTIVITIES OF DAILY LIVING (ADL)
ADLS_ACUITY_SCORE: 18
ADLS_ACUITY_SCORE: 23
ADLS_ACUITY_SCORE: 18
ADLS_ACUITY_SCORE: 23
ADLS_ACUITY_SCORE: 18
ADLS_ACUITY_SCORE: 18

## 2025-07-31 NOTE — DISCHARGE SUMMARY
Hospitalist Discharge Summary  [facility]    Razia Almeida MRN# [id number]   YOB: 1965 Age: 59 year old     Date of Admission:  7/30/2025  Date of Discharge:  7/31/2025  Admitting Physician:  Je Chávez MD  Discharge Physician:  Rickie Mcgarry DO  Discharging Service:  Hospitalist     Primary Provider: Junior Marino          Discharge Diagnosis:     Chronic left facial droop  Vertebral artery stenosis  History of fibromuscular dysplasia  History of left carotid artery dissection  History of melanoma             Discharge Disposition:     Discharged to home           Allergies:     No Known Allergies           Discharge Medications:     Current Discharge Medication List        START taking these medications    Details   aspirin 81 MG EC tablet Take 1 tablet (81 mg) by mouth daily.  Qty: 30 tablet, Refills: 2    Associated Diagnoses: Facial droop           CONTINUE these medications which have NOT CHANGED    Details   amphetamine-dextroamphetamine (ADDERALL XR) 25 MG 24 hr capsule Take 25 mg by mouth every morning.      atorvastatin (LIPITOR) 10 MG tablet Take 10 mg by mouth daily.      BIOTIN PO Take 1 tablet by mouth daily.      buPROPion (WELLBUTRIN XL) 150 MG 24 hr tablet Take 150 mg by mouth daily      calcium-vitamin D (CALTRATE) 600-400 MG-UNIT per tablet Take 1 tablet by mouth 2 times daily.      celecoxib (CELEBREX) 200 MG capsule Take 200 mg by mouth daily       doxazosin (CARDURA) 4 MG tablet Take 4 mg by mouth daily      estradiol (ESTRACE) 1 MG tablet Take 1 mg by mouth daily.      fish oil-omega-3 fatty acids 1000 MG capsule Take 1 g by mouth 2 times daily.      gabapentin (NEURONTIN) 300 MG capsule Take 300 mg by mouth 2 times daily as needed for other (paresthesias).      Glucosamine-Chondroit-Vit C-Mn (GLUCOSAMINE 1500 COMPLEX) CAPS Take 1,500 mg by mouth 2 times daily.      HYDROcodone-acetaminophen (NORCO) 5-325 MG tablet Take 1  tablet by mouth every 8 hours as needed for severe pain.      PREBIOTIC PRODUCT PO Take 1 capsule by mouth daily.      SUMAtriptan (IMITREX) 100 MG tablet Take 1 tablet (100 mg) by mouth at onset of headache  Qty: 30 tablet, Refills: 3    Associated Diagnoses: Migraine without status migrainosus, not intractable, unspecified migraine type      zolpidem (AMBIEN) 10 MG tablet Take 5 mg by mouth nightly as needed for sleep.                    Condition on Discharge:     Discharge condition: Stable   Discharge vitals: Blood pressure (!) 122/92, pulse 78, temperature 97.8  F (36.6  C), temperature source Oral, resp. rate 18, weight 60.3 kg (132 lb 14.4 oz), SpO2 96%, not currently breastfeeding.   Code status on discharge: Full Code      BASIC PHYSICAL EXAMINATION:  GENERAL: No apparent distress.  CARDIOVASCULAR: Regular rate and rhythm without murmurs.  PULMONARY: Clear to auscultation bilaterally.   GASTROINTESTINAL: Abdomen soft, non-tender.  EXTREMITIES: No edema, pulses intact.  NEUROLOGIC: No focal deficits.            History of Illness:   See detailed admission note for full details.               Procedures excluding imaging which is summarized below:     Please see details in the electronic medical record.           Consultations:     NEUROLOGY IP STROKE CONSULT  SPEECH LANGUAGE PATH ADULT IP CONSULT  SMOKING CESSATION PROGRAM IP CONSULT  NEUROLOGY IP STROKE CONSULT          Significant Results:     Results for orders placed or performed during the hospital encounter of 07/30/25   MR Brain w/o & w Contrast    Narrative    EXAM: MR BRAIN W/O and W CONTRAST  LOCATION: [facility]  DATE: 7/30/2025    INDICATION: Acute neuro deficit, stroke suspected; left-sided facial droop  COMPARISON: CTA dated 7/12/2017  CONTRAST: 6 mL Gadavist  TECHNIQUE: Routine multiplanar multisequence head MRI without and with intravenous contrast    FINDINGS:  INTRACRANIAL CONTENTS: No acute/subacute infarct, acute  hemorrhage, or extra-axial fluid collection. No intracranial mass or abnormal enhancement. Normal brain parenchymal signal. Normal ventricles and sulci for age. Normal position of the cerebellar   tonsils.     SELLA: Within technique limitations, no gross abnormality.    OSSEOUS STRUCTURES/SOFT TISSUES: No suspicious bone marrow signal intensity. Degenerative change of the visualized upper cervical spine with disc-osteophyte complex effacing the ventral spinal cord at C3-C4. The major intracranial vascular flow voids are   maintained.     SINUSES/MASTOIDS: New since the prior CTA, mild-moderate left maxillary sinus mucosal thickening with suggestion of small-volume layering secretions. Grossly well-aerated remainder of the paranasal sinuses. No mastoid effusion.        Impression    IMPRESSION:  1.  No acute intracranial abnormality.  2.  Left maxillary sinus mucosal thickening with suggestion of layering secretions. Correlate clinically to exclude acute sinusitis.   Head CT w/o contrast    Addendum: 7/30/2025    CLINICAL ADDENDUM:   Clinical information in this report has been modified from the previous version as follows: Additionally, unchanged subtle focal outpouching arising from the anterolateral aspect of the mid cervical right internal carotid artery, suspicious for   additional chronic dissection/pseudoaneurysm (series 11 image 278).    END ADDENDUM      Narrative    EXAM: CT HEAD W/O CONTRAST, CTA HEAD NECK W CONTRAST  LOCATION: [facility]  DATE: 7/30/2025    INDICATION: Left-sided facial droop  COMPARISON: CTA dated 7/12/2017   CONTRAST: 67mL Isovue 370  TECHNIQUE: Head and neck CT angiogram with IV contrast. Noncontrast head CT followed by axial helical CT images of the head and neck vessels obtained during the arterial phase of intravenous contrast administration. Axial 2D reconstructed images and   multiplanar 3D MIP reconstructed images of the head and neck vessels were  performed by the technologist. Dose reduction techniques were used. All stenosis measurements made according to NASCET criteria unless otherwise specified.    FINDINGS:   NONCONTRAST HEAD CT:   INTRACRANIAL CONTENTS: No acute intracranial hemorrhage, extraaxial collection, or mass effect. No evidence of an acute transcortical confluent infarct. Normal parenchymal attenuation. Normal ventricles and sulci for age.     VISUALIZED ORBITS/SINUSES/MASTOIDS: No acute intraorbital finding. No significant paranasal sinus or mastoid mucosal disease.     BONES/SOFT TISSUES: No acute abnormality.    HEAD CTA:  ANTERIOR CIRCULATION: No high-grade stenosis, occlusion, aneurysm, or high-flow vascular malformation. Standard [place] anatomy.    POSTERIOR CIRCULATION: New since the prior exam, marked luminal irregularity and moderate-severe long-segment stenosis involving the V3/V4 junction and V4 segment of the right vertebral artery, which otherwise remains patent. Patent left vertebral,   basilar, and bilateral posterior cerebral arteries without high-grade stenosis. No aneurysm or high-flow vascular malformation. Redemonstrated vertebrobasilar dolichoectasia.    DURAL VENOUS SINUSES: Expected enhancement of the major dural venous sinuses. Please note that this exam is not specifically tailored for the assessment of the intracranial venous structures.    NECK CTA:  RIGHT CAROTID: No hemodynamically significant stenosis or dissection. Diffuse nonspecific tortuosity of the ICA.    LEFT CAROTID: Allowing for differences in technique, unchanged mild fusiform dilation involving the distal ICA with a 3 x 2 mm focal outpouching projecting posteriorly and inferiorly, most consistent with chronic dissection/pseudoaneurysm. No   hemodynamically significant stenosis or evidence of new dissection. Similar diffuse nonspecific tortuosity of the ICA.    VERTEBRAL ARTERIES: New since the prior exam, diffusely small right vertebral artery  caliber with mild apparent mural thickening without luminal filling defect, pseudoaneurysm, or high-grade stenosis. Patent left vertebral artery without focal high-grade   stenosis or dissection.     AORTIC ARCH: Classic aortic arch anatomy with no significant stenosis at the origin of the great vessels.    NONVASCULAR STRUCTURES: Reversal of the normal cervical lordosis with 2 mm stepwise degenerative anterolisthesis at C2-C4. Multilevel spondylosis including moderate interbody degenerative change at C4-C7 without high-grade spinal canal stenosis.   Bilateral palatine tonsilloliths. Scattered facet arthrosis, worst and advanced at C2-C4 on the left. Clear visualized lungs.      Impression    IMPRESSION:   HEAD CT:  1.  No acute intracranial abnormality.    HEAD CTA:   1.  New since 7/12/2017, marked luminal irregularity and high-grade long-segment stenosis involving the V3/V4 junction and V4 segment of the right vertebral artery, most consistent with the sequela of age-indeterminate dissection.  2.  Patent remainder of the [place] without high-grade stenosis.  3.  No aneurysm or high-flow vascular malformation.    NECK CTA:  1.  New since 2017, diffusely small right vertebral artery caliber may be due to high-grade downstream stenosis versus age-indeterminate dissection. No high-grade stenosis, pseudoaneurysm, or intimal flap.  2.  Unchanged fusiform dilation involving the distal cervical left ICA with a 3 x 2 mm superimposed focal outpouching, most consistent with chronic dissection/pseudoaneurysm.  3.  Patent bilateral carotid and left vertebral arteries without hemodynamically significant stenosis.   CTA Head Neck with Contrast    Addendum: 7/30/2025    CLINICAL ADDENDUM:   Clinical information in this report has been modified from the previous version as follows: Additionally, unchanged subtle focal outpouching arising from the anterolateral aspect of the mid cervical right internal carotid artery,  suspicious for   additional chronic dissection/pseudoaneurysm (series 11 image 278).    END ADDENDUM      Narrative    EXAM: CT HEAD W/O CONTRAST, CTA HEAD NECK W CONTRAST  LOCATION: [facility]  DATE: 7/30/2025    INDICATION: Left-sided facial droop  COMPARISON: CTA dated 7/12/2017   CONTRAST: 67mL Isovue 370  TECHNIQUE: Head and neck CT angiogram with IV contrast. Noncontrast head CT followed by axial helical CT images of the head and neck vessels obtained during the arterial phase of intravenous contrast administration. Axial 2D reconstructed images and   multiplanar 3D MIP reconstructed images of the head and neck vessels were performed by the technologist. Dose reduction techniques were used. All stenosis measurements made according to NASCET criteria unless otherwise specified.    FINDINGS:   NONCONTRAST HEAD CT:   INTRACRANIAL CONTENTS: No acute intracranial hemorrhage, extraaxial collection, or mass effect. No evidence of an acute transcortical confluent infarct. Normal parenchymal attenuation. Normal ventricles and sulci for age.     VISUALIZED ORBITS/SINUSES/MASTOIDS: No acute intraorbital finding. No significant paranasal sinus or mastoid mucosal disease.     BONES/SOFT TISSUES: No acute abnormality.    HEAD CTA:  ANTERIOR CIRCULATION: No high-grade stenosis, occlusion, aneurysm, or high-flow vascular malformation. Standard [place] anatomy.    POSTERIOR CIRCULATION: New since the prior exam, marked luminal irregularity and moderate-severe long-segment stenosis involving the V3/V4 junction and V4 segment of the right vertebral artery, which otherwise remains patent. Patent left vertebral,   basilar, and bilateral posterior cerebral arteries without high-grade stenosis. No aneurysm or high-flow vascular malformation. Redemonstrated vertebrobasilar dolichoectasia.    DURAL VENOUS SINUSES: Expected enhancement of the major dural venous sinuses. Please note that this exam is not  specifically tailored for the assessment of the intracranial venous structures.    NECK CTA:  RIGHT CAROTID: No hemodynamically significant stenosis or dissection. Diffuse nonspecific tortuosity of the ICA.    LEFT CAROTID: Allowing for differences in technique, unchanged mild fusiform dilation involving the distal ICA with a 3 x 2 mm focal outpouching projecting posteriorly and inferiorly, most consistent with chronic dissection/pseudoaneurysm. No   hemodynamically significant stenosis or evidence of new dissection. Similar diffuse nonspecific tortuosity of the ICA.    VERTEBRAL ARTERIES: New since the prior exam, diffusely small right vertebral artery caliber with mild apparent mural thickening without luminal filling defect, pseudoaneurysm, or high-grade stenosis. Patent left vertebral artery without focal high-grade   stenosis or dissection.     AORTIC ARCH: Classic aortic arch anatomy with no significant stenosis at the origin of the great vessels.    NONVASCULAR STRUCTURES: Reversal of the normal cervical lordosis with 2 mm stepwise degenerative anterolisthesis at C2-C4. Multilevel spondylosis including moderate interbody degenerative change at C4-C7 without high-grade spinal canal stenosis.   Bilateral palatine tonsilloliths. Scattered facet arthrosis, worst and advanced at C2-C4 on the left. Clear visualized lungs.      Impression    IMPRESSION:   HEAD CT:  1.  No acute intracranial abnormality.    HEAD CTA:   1.  New since 7/12/2017, marked luminal irregularity and high-grade long-segment stenosis involving the V3/V4 junction and V4 segment of the right vertebral artery, most consistent with the sequela of age-indeterminate dissection.  2.  Patent remainder of the [place] without high-grade stenosis.  3.  No aneurysm or high-flow vascular malformation.    NECK CTA:  1.  New since 2017, diffusely small right vertebral artery caliber may be due to high-grade downstream stenosis versus  age-indeterminate dissection. No high-grade stenosis, pseudoaneurysm, or intimal flap.  2.  Unchanged fusiform dilation involving the distal cervical left ICA with a 3 x 2 mm superimposed focal outpouching, most consistent with chronic dissection/pseudoaneurysm.  3.  Patent bilateral carotid and left vertebral arteries without hemodynamically significant stenosis.   Echocardiogram Complete *Canceled*    Narrative    The following orders were created for panel order Echocardiogram Complete.  Procedure                               Abnormality         Status                     ---------                               -----------         ------                       Please view results for these tests on the individual orders.   Echocardiogram Complete *Canceled*    Narrative    The following orders were created for panel order Echocardiogram Complete.  Procedure                               Abnormality         Status                     ---------                               -----------         ------                       Please view results for these tests on the individual orders.       Transthoracic Echocardiogram Results:  No results found for this or any previous visit (from the past 4320 hours).             Pending Results:     Unresulted Labs Ordered in the Past 30 Days of this Admission       No orders found for last 31 day(s).                        Discharge Instructions and Follow-Up:     Discharge instructions and follow-up:   Discharge Procedure Orders   Reason for your hospital stay   Order Comments: Facial droop.     Activity   Order Comments: Your activity upon discharge: activity as tolerated     Order Specific Question Answer Comments   Is discharge order? Yes      Full Code     Order Specific Question Answer Comments   Code status determined by: Discussion with patient/ legal decision maker      Diet   Order Comments: Follow this diet upon discharge: Current Diet:Orders Placed This Encounter       Combination Diet Regular Diet     Order Specific Question Answer Comments   Is discharge order? Yes      Hospital Follow-up with Existing Primary Care Provider (PCP)   Standing Status: Future Standing Exp. Date: 08/30/25   Order Comments:       Order Specific Question Answer Comments   Schedule Primary Care visit within 30 Days              Hospital Course:     Razia Almeida is a 59 year old with a history of fibromuscular dysplasia complicated by left carotid artery dissection and melanoma (in remission) presented with left facial droop. Patient was in her normal state of health until yesterday afternoon when she went to a routine optometry appointment and the optometrist noted her to have a left facial droop and sent her to the ED. Says she has never noticed this and  at bedside also says he hasn't noticed it. Otherwise denies focal weakness, numbness, tingling.     She has a history of fibromuscular dysplasia (asymptomatic; diagnosed after CTA in 2016 with web-like stenosis of right renal artery) complicated by carotid artery dissection (also asymptomatic and found incidentally on imaging for her melanoma.      In the ED, hypertensive but other vitals were stable. CTA with evidence of known carotid artery dissection as well as right vertebral artery stenosis. MRI without evidence of stroke. Started on DAPT and admitted to medicine.     I spoke with neurology today who indicates that her facial droop is chronic and there is nothing to support a TIA/CVA. Further stroke work up has been deferred and the recommendation is for discharge home on ASA 81 mg daily. The patient is comfortable with this idea.    The patient was seen, examined, and counseled on this day. The hospitalization and plan of care was reviewed with the patient extensively. All questions were addressed and the patient agreed to follow-up as noted above.      Total time spent in face to face contact with the patient and coordinating  discharge was:  35 Minutes    Rickie Mcgarry DO MPH  [facility] Hospitalist  [address] [place] [address]  07/31/2025

## 2025-07-31 NOTE — PHARMACY-ADMISSION MEDICATION HISTORY
Pharmacy Intern Admission Medication History    Admission medication history is complete. The information provided in this note is only as accurate as the sources available at the time of the update.    Information Source(s): Patient via in-person    Pertinent Information: Patient notes that at times she will only take 0.5 tablet of hydrocodone-acetaminophen 5-325 mg tablet.    Changes made to PTA medication list:  Added: Adderall, gabapentin, fish oil, biotin  Deleted:   Acyclovir 800 mg tablet five times daily as needed (old prescription)  Ibuprofen 600 mg tablet every 6 hours as needed (moderate pain) (old prescription)  Lorazepam 0.5 mg tablet every 8 hours as needed (old prescription)  Methocarbamol 500 mg tablet as needed (old prescription)  Changed:   Caltrate tablet once daily --> twice daily  Estradiol 1 mg tablet - 1.5 tablet daily --> 1 tablet daily  Glucosamine 1500 complex capsule - 3000 mg every evening --> 1500 mg twice daily  Hydrocodone-acetaminophen 5-325 mg - 1-2 tablets every 6 hours as needed --> 1 tablet every 8 hours as needed (pain)  Probiotic --> Prebiotic  Zolpidem 10 mg tablet once daily at bedtime --> 5 mg at bedtime as needed (sleep)    Allergies reviewed with patient and updates made in EHR: yes    Medication History Completed By: Fior Paul 7/30/2025 9:26 PM    PTA Med List   Medication Sig Last Dose/Taking    amphetamine-dextroamphetamine (ADDERALL XR) 25 MG 24 hr capsule Take 25 mg by mouth every morning. 7/30/2025 Morning    atorvastatin (LIPITOR) 10 MG tablet Take 10 mg by mouth daily. 7/29/2025 Evening    BIOTIN PO Take 1 tablet by mouth daily. 7/30/2025 Morning    buPROPion (WELLBUTRIN XL) 150 MG 24 hr tablet Take 150 mg by mouth daily 7/30/2025 Morning    calcium-vitamin D (CALTRATE) 600-400 MG-UNIT per tablet Take 1 tablet by mouth 2 times daily. 7/30/2025 Morning    celecoxib (CELEBREX) 200 MG capsule Take 200 mg by mouth daily  7/30/2025 Morning    doxazosin (CARDURA)  4 MG tablet Take 4 mg by mouth daily 7/30/2025 Morning    estradiol (ESTRACE) 1 MG tablet Take 1 mg by mouth daily. 7/29/2025 Evening    fish oil-omega-3 fatty acids 1000 MG capsule Take 1 g by mouth 2 times daily. 7/30/2025 Morning    gabapentin (NEURONTIN) 300 MG capsule Take 300 mg by mouth 2 times daily as needed for other (paresthesias). Past Week    Glucosamine-Chondroit-Vit C-Mn (GLUCOSAMINE 1500 COMPLEX) CAPS Take 1,500 mg by mouth 2 times daily. 7/30/2025 Morning    HYDROcodone-acetaminophen (NORCO) 5-325 MG tablet Take 1 tablet by mouth every 8 hours as needed for severe pain. 7/29/2025    PREBIOTIC PRODUCT PO Take 1 capsule by mouth daily. 7/29/2025 Evening    SUMAtriptan (IMITREX) 100 MG tablet Take 1 tablet (100 mg) by mouth at onset of headache Past Week    zolpidem (AMBIEN) 10 MG tablet Take 5 mg by mouth nightly as needed for sleep. 7/29/2025 Bedtime

## 2025-07-31 NOTE — PLAN OF CARE
PRIMARY DIAGNOSIS: TIA  OUTPATIENT/OBSERVATION GOALS TO BE MET BEFORE DISCHARGE:  1. Orthostatic performed: No    2. Diagnostic testing complete & at baseline neurologic testing: No    3. Cleared by consultants (if involved): No    4. Interpretation of cardiac rhythm per telemetry tech: SA    5. Tolerating adequate PO diet and medications: Yes    6. Return to near baseline physical activity or neurologic status: Yes    Discharge Planner Nurse   Safe discharge environment identified: Yes  Barriers to discharge: Yes       Entered by: Susy Mann RN 07/31/2025 12:27 AM     Please review provider order for any additional goals.   Nurse to notify provider when observation goals have been met and patient is ready for discharge.    Assume care [id number]. A&Ox4. Up ad rogelio when OOB. On RA. On a regular diet. Neuros intact other than known very slight left sided drooping to mouth. Denies pain/discomfort. On telemetry- SA. Plan for an ECHO today. Plan of care ongoing.

## 2025-07-31 NOTE — H&P
[facility]       Hospitalist History & Physical     Assessment & Plan     ASSESSMENT    59F with hx of fibromuscular dysplasia c/b L carotid artery dissection and melanoma (in remission) presents with left facial droop.    PLAN    TIA & Vertebral Artery Stenosis  Hx of Fibromuscular Dysplasia & L Carotid Artery Dissection  Presents with L facial droop, no stroke on MRI but CTA imaging with new high-grade stenosis of R vertebral artery. Unclear if these findings may be related to patient's underlying fibromuscular dysplasia  Was not a tpa candidate given unknown sx onset and NIH of 0 on arrival  Plavix 75mg every day + Aspirin 81mg every day  Home Atorvastatin 10mg every day and check lipid panel  Scheduled neuro checks  Obtain HgA1c, echo, and monitor on telemetry  Stroke neurology consultation    Questionable Left Sinusitis  MRI with Left maxillary sinus mucosal thickening with suggestion of layering secretions  Sinusitis could conveibably cause patient's facial droop if involvement of facial nerve however no complaints of sx at this time so will not treat    Melanoma  S/p surgery now in remission    DVT Prophy  SCDs    Disposition  Medically Ready for Discharge: Anticipated Tomorrow       Karlo Pino MD    History of Present Illness     Razia Almeida is a 59 year old with hx of fibromuscular dysplasia c/b L carotid artery dissection and melanoma (in remission) presents with left facial droop. Patient was in her normal state of health until this afternoon when she went to a routine optometry appointment and the optometrist noted her to have a L facial droop and sent her to the ED. Says she has never noticed this and  at bedside also says he hasn't noticed it. Otherwise denies focal weakness, numbness, tingling. Hx of fibromuscular dysplasia (asymptomatic; diagnosed after CTA in 2016 with web-like stenosis of R renal artery) c/b carotid artery dissection (also asymptomatic and found  incidentally on imaging for her melanoma. In the ED, hypertensive but other VSS. CTA with evidence of known carotid artery dissection as well as R vertebral artery stenosis. MRI without evidence of stroke. Started on DAPT and admitted to medicine.    Review of Systems     A Comprehensive greater than 10 system review of systems was carried out.  Pertinent positives and negatives are noted above.  Otherwise negative for contributory information.     Past Medical History     Past Medical History:   Diagnosis Date    Anxiety     Cancer (H)     melanoma of left axillry lymphnodes recurrent 10/2016.    Carotid artery dissection     Fibromuscular dysplasia     renal artery    Knee pain, chronic     Migraine headache     Myofascial pain     Neck pain, chronic     Raynaud's disease      Medications     Current Outpatient Medications   Medication Sig Dispense Refill    amphetamine-dextroamphetamine (ADDERALL XR) 25 MG 24 hr capsule Take 25 mg by mouth every morning.      atorvastatin (LIPITOR) 10 MG tablet Take 10 mg by mouth daily.      BIOTIN PO Take 1 tablet by mouth daily.      buPROPion (WELLBUTRIN XL) 150 MG 24 hr tablet Take 150 mg by mouth daily      calcium-vitamin D (CALTRATE) 600-400 MG-UNIT per tablet Take 1 tablet by mouth 2 times daily.      celecoxib (CELEBREX) 200 MG capsule Take 200 mg by mouth daily       doxazosin (CARDURA) 4 MG tablet Take 4 mg by mouth daily      estradiol (ESTRACE) 1 MG tablet Take 1 mg by mouth daily.      fish oil-omega-3 fatty acids 1000 MG capsule Take 1 g by mouth 2 times daily.      gabapentin (NEURONTIN) 300 MG capsule Take 300 mg by mouth 2 times daily as needed for other (paresthesias).      Glucosamine-Chondroit-Vit C-Mn (GLUCOSAMINE 1500 COMPLEX) CAPS Take 1,500 mg by mouth 2 times daily.      HYDROcodone-acetaminophen (NORCO) 5-325 MG tablet Take 1 tablet by mouth every 8 hours as needed for severe pain.      PREBIOTIC PRODUCT PO Take 1 capsule by mouth daily.       SUMAtriptan (IMITREX) 100 MG tablet Take 1 tablet (100 mg) by mouth at onset of headache 30 tablet 3    zolpidem (AMBIEN) 10 MG tablet Take 5 mg by mouth nightly as needed for sleep.        Past Surgical History     Past Surgical History:   Procedure Laterality Date    ABDOMEN SURGERY      BIOPSY/REMOVAL, LYMPH NODE(S)      EXCISE MASS NECK Right 11/2/2017    Procedure: EXCISE MASS NECK;  Excision Right Posterior Neck Mass;  Surgeon: Alhaji Mccarthy MD;  Location: [facility] OR    HC KNEE SCOPE, W/LATERAL RELEASE      HYSTERECTOMY      LUMPECTOMY BREAST, DISSECT AXILLARY NODE(S), COMBINED Left 11/4/2016    Procedure: COMBINED LUMPECTOMY BREAST, DISSECT AXILLARY NODE(S);  Surgeon: Iris Cota MD;  Location: [facility] OR     Family History     Family History   Problem Relation Age of Onset    Heart Disease Paternal Grandfather         heart disease    Cancer Sister         breast    Breast Cancer Sister     Cerebrovascular Disease Maternal Grandmother     Hypertension Maternal Grandmother      Allergies     No Known Allergies    Social History     Social History     Tobacco Use    Smoking status: Former     Types: Cigarettes    Smokeless tobacco: Never    Tobacco comments:     quit in 2011.   Substance Use Topics    Alcohol use: Yes     Alcohol/week: 0.0 - 1.0 standard drinks of alcohol     Comment: occas     Physical Exam     Blood pressure (!) 153/100, pulse 92, temperature 98.1  F (36.7  C), temperature source Temporal, resp. rate 16, weight 60.9 kg (134 lb 4.2 oz), SpO2 100%, not currently breastfeeding.    General: Ill appearing, cooperative with exam, in NAD.  HEENT: Atraumatic. No erythema in posterior pharynx.  Lymph: No cervical or inguinal lymphadenopathy.  Cardiac: RRR. No murmurs.  Lungs: CTAB. Nl WOB.  Abd: Non-tender. No rebound or gaurding. Nl bowel sounds.  Ext: No edema. 2+ pulses.  Skin: No rashes, abrasions, or contusions.  Psych: A&Ox3. Nl affect.  Neuro: Slight L facial droop noted. Other CN intact.  FNF and ROM intact. 5/5 strength. Sensation intact.    Labs & Imaging     Reviewed and Pertinent results discussed in assessment and plan.

## 2025-07-31 NOTE — PLAN OF CARE
"Goal Outcome Evaluation:      Plan of Care Reviewed With: patient    Overall Patient Progress: improvingOverall Patient Progress: improving    Outcome Evaluation: A&Ox4. VSS. Up independently. On room air. Denies pain, nausea or SOB. Discharged home today.      Problem: Adult Inpatient Plan of Care  Goal: Plan of Care Review  Description: The Plan of Care Review/Shift note should be completed every shift.  The Outcome Evaluation is a brief statement about your assessment that the patient is improving, declining, or no change.  This information will be displayed automatically on your shift  note.  Outcome: Adequate for Care Transition  Flowsheets (Taken 7/31/2025 1207)  Outcome Evaluation: A&Ox4. VSS. Up independently. On room air. Denies pain, nausea or SOB. Discharged home today.  Plan of Care Reviewed With: patient  Overall Patient Progress: improving  Goal: Patient-Specific Goal (Individualized)  Description: You can add care plan individualizations to a care plan. Examples of Individualization might be:  \"Parent requests to be called daily at 9am for status\", \"I have a hard time hearing out of my right ear\", or \"Do not touch me to wake me up as it startles  me\".  Outcome: Adequate for Care Transition  Goal: Absence of Hospital-Acquired Illness or Injury  Outcome: Adequate for Care Transition  Intervention: Identify and Manage Fall Risk  Recent Flowsheet Documentation  Taken 7/31/2025 0741 by Cyndie Kenyon, RN  Safety Promotion/Fall Prevention:   activity supervised   clutter free environment maintained   nonskid shoes/slippers when out of bed   safety round/check completed  Intervention: Prevent Skin Injury  Recent Flowsheet Documentation  Taken 7/31/2025 0741 by Cyndie Kenyon, RN  Body Position: position changed independently  Intervention: Prevent Infection  Recent Flowsheet Documentation  Taken 7/31/2025 0741 by Cyndie Kenyon, RN  Infection Prevention:   hand hygiene promoted   single patient room " provided  Goal: Optimal Comfort and Wellbeing  Outcome: Adequate for Care Transition  Goal: Readiness for Transition of Care  Outcome: Adequate for Care Transition

## 2025-07-31 NOTE — ED NOTES
"[facility]  ED Nurse Handoff Report    ED Chief complaint: Facial Droop  . ED Diagnosis:   Final diagnoses:   Facial droop - possible TIA       Allergies: No Known Allergies    Code Status: Full Code    Activity level - Baseline/Home:  independent.  Activity Level - Current:   standby.   Lift room needed: No.   Bariatric: No   Needed: No   Isolation: No.   Infection: Not Applicable.     Respiratory status: Room air    Vital Signs (within 30 minutes):   Vitals:    07/30/25 1649 07/30/25 1651 07/30/25 2100   BP:  (!) 153/100 (!) 130/94   Pulse:  92 79   Resp:  16 18   Temp:  98.1  F (36.7  C)    TempSrc:  Temporal    SpO2:  100% 97%   Weight: 60.9 kg (134 lb 4.2 oz)         Cardiac Rhythm:  ,      Pain level:    Patient confused: No.   Patient Falls Risk: patient and family education.   Elimination Status: Has voided     Patient Report - Initial Complaint: Razia Amleida is a 59 year old female with history of FMD and right carotid artery dissection/pseudoaneurysm presenting to the ER for evaluation of  provider comments of left facial drooping.  Patient woke up at 6 AM and felt completely normal.  Currently, denies any symptoms of facial heaviness/drooping.  She went to her dentist and eye doctor appointment this morning.  Eye doctor did a dilated eye exam and commented that the patient's left face looked droopy.  Patient feels normal.  They sent her to urgent care and urgent care checked her out and reportedly was normal but sent her to the ER \"out of an abundance of precaution\".  Patient overall feels well Pillard denies any new headache, dizziness, vision loss/double vision, speech disturbance, extremity numbness/tingling, facial heaviness.     She does report she had tick bike \"several weeks ago\".  Tick was not engorged.  No rashes.  No fever, joint aches, fatigue.  She is not on antiplatelet or anticoagulation..               Abnormal Results:   Labs Ordered and Resulted from " Time of ED Arrival to Time of ED Departure   BASIC METABOLIC PANEL (LIMITED OCCURRENCES) - Abnormal       Result Value    Sodium 139      Potassium 3.9      Chloride 101      Carbon Dioxide (CO2) 25      Anion Gap 13      Urea Nitrogen 15.9      Creatinine 0.68      GFR Estimate >90      Calcium 9.2      Glucose 171 (*)    GLUCOSE BY METER - Abnormal    GLUCOSE BY METER POCT 125 (*)    CBC WITH PLATELETS (LIMITED OCCURRENCES) - Normal    WBC Count 7.5      RBC Count 4.60      Hemoglobin 13.0      Hematocrit 39.4      MCV 86      MCH 28.3      MCHC 33.0      RDW 12.8      Platelet Count 260     GLUCOSE MONITOR NURSING POCT   HEMOGLOBIN A1C        MR Brain w/o & w Contrast   Final Result   IMPRESSION:   1.  No acute intracranial abnormality.   2.  Left maxillary sinus mucosal thickening with suggestion of layering secretions. Correlate clinically to exclude acute sinusitis.      CTA Head Neck with Contrast   Final Result   Addendum (preliminary) 1 of 1   CLINICAL ADDENDUM:    Clinical information in this report has been modified from the previous    version as follows: Additionally, unchanged subtle focal outpouching    arising from the anterolateral aspect of the mid cervical right internal    carotid artery, suspicious for    additional chronic dissection/pseudoaneurysm (series 11 image 278).      END ADDENDUM      Final   IMPRESSION:    HEAD CT:   1.  No acute intracranial abnormality.      HEAD CTA:    1.  New since 7/12/2017, marked luminal irregularity and high-grade long-segment stenosis involving the V3/V4 junction and V4 segment of the right vertebral artery, most consistent with the sequela of age-indeterminate dissection.   2.  Patent remainder of the [place] without high-grade stenosis.   3.  No aneurysm or high-flow vascular malformation.      NECK CTA:   1.  New since 2017, diffusely small right vertebral artery caliber may be due to high-grade downstream stenosis versus age-indeterminate  dissection. No high-grade stenosis, pseudoaneurysm, or intimal flap.   2.  Unchanged fusiform dilation involving the distal cervical left ICA with a 3 x 2 mm superimposed focal outpouching, most consistent with chronic dissection/pseudoaneurysm.   3.  Patent bilateral carotid and left vertebral arteries without hemodynamically significant stenosis.      Head CT w/o contrast   Final Result   Addendum (preliminary) 1 of 1   CLINICAL ADDENDUM:    Clinical information in this report has been modified from the previous    version as follows: Additionally, unchanged subtle focal outpouching    arising from the anterolateral aspect of the mid cervical right internal    carotid artery, suspicious for    additional chronic dissection/pseudoaneurysm (series 11 image 278).      END ADDENDUM      Final   IMPRESSION:    HEAD CT:   1.  No acute intracranial abnormality.      HEAD CTA:    1.  New since 7/12/2017, marked luminal irregularity and high-grade long-segment stenosis involving the V3/V4 junction and V4 segment of the right vertebral artery, most consistent with the sequela of age-indeterminate dissection.   2.  Patent remainder of the [place] without high-grade stenosis.   3.  No aneurysm or high-flow vascular malformation.      NECK CTA:   1.  New since 2017, diffusely small right vertebral artery caliber may be due to high-grade downstream stenosis versus age-indeterminate dissection. No high-grade stenosis, pseudoaneurysm, or intimal flap.   2.  Unchanged fusiform dilation involving the distal cervical left ICA with a 3 x 2 mm superimposed focal outpouching, most consistent with chronic dissection/pseudoaneurysm.   3.  Patent bilateral carotid and left vertebral arteries without hemodynamically significant stenosis.          Treatments provided: See MAR  Family Comments: At bedside  OBS brochure/video discussed/provided to patient:  Yes  ED Medications:   Medications   aspirin (ASA) chewable tablet 81 mg (has  no administration in time range)   clopidogrel (PLAVIX) tablet 75 mg (has no administration in time range)   atorvastatin (LIPITOR) tablet 10 mg (has no administration in time range)   iopamidol (ISOVUE-370) solution 500 mL (67 mLs Intravenous $Given 7/30/25 1802)   sodium chloride 0.9 % bag for CT scan flush (80 mLs Intravenous $Given 7/30/25 1802)   gadobutrol (GADAVIST) injection 6 mL (6 mLs Intravenous $Given 7/30/25 1819)   clopidogrel (PLAVIX) tablet 300 mg (300 mg Oral $Given 7/30/25 2052)   aspirin (ASA) tablet 325 mg (325 mg Oral $Given 7/30/25 2052)       Drips infusing:  No  For the majority of the shift this patient was Green.   Interventions performed were NA.    Sepsis treatment initiated: No    Cares/treatment/interventions/medications to be completed following ED care: Admit for observation.    ED Nurse Name: Lucia Bingham RN  10:01 PM    RECEIVING UNIT ED HANDOFF REVIEW    Above ED Nurse Handoff Report was reviewed: Yes  Reviewed by: Susy Mann RN on July 30, 2025 at 11:12 PM   SALINA Grimes called the ED to inform them the note was read: Yes

## 2025-07-31 NOTE — PLAN OF CARE
PRIMARY DIAGNOSIS: TIA  OUTPATIENT/OBSERVATION GOALS TO BE MET BEFORE DISCHARGE:  1. Orthostatic performed: Yes:                    Sitting Orthostatic BP: 110/80         Standing Orthostatic BP: 114/81     2. Diagnostic testing complete & at baseline neurologic testing: No    3. Cleared by consultants (if involved): No    4. Interpretation of cardiac rhythm per telemetry tech: SR    5. Tolerating adequate PO diet and medications: Yes    6. Return to near baseline physical activity or neurologic status: Yes    Discharge Planner Nurse   Safe discharge environment identified: Yes  Barriers to discharge: Yes       Entered by: Susy Mann RN 07/31/2025 1:56 AM     Please review provider order for any additional goals.   Nurse to notify provider when observation goals have been met and patient is ready for discharge.    Assume care [id number]. A&Ox4. Up ad rogelio when OOB. On RA. On a regular diet. Neuros intact other than known very slight left sided drooping to mouth. Denies pain/discomfort. On telemetry- SR. Plan for an ECHO today. Plan of care ongoing.     Goal Outcome Evaluation:      Plan of Care Reviewed With: patient    Overall Patient Progress: no changeOverall Patient Progress: no change    Outcome Evaluation: A&Ox4, neuros intact & unchanged besides known L sided droop to mouth, on tele, denies pain    Problem: Adult Inpatient Plan of Care  Goal: Plan of Care Review  Description: The Plan of Care Review/Shift note should be completed every shift.  The Outcome Evaluation is a brief statement about your assessment that the patient is improving, declining, or no change.  This information will be displayed automatically on your shift  note.  Outcome: Progressing  Flowsheets (Taken 7/31/2025 0154)  Outcome Evaluation: A&Ox4, neuros intact & unchanged besides known L sided droop to mouth, on tele, denies pain  Plan of Care Reviewed With: patient  Overall Patient Progress: no change  Goal: Patient-Specific Goal  "(Individualized)  Description: You can add care plan individualizations to a care plan. Examples of Individualization might be:  \"Parent requests to be called daily at 9am for status\", \"I have a hard time hearing out of my right ear\", or \"Do not touch me to wake me up as it startles  me\".  Outcome: Progressing  Goal: Absence of Hospital-Acquired Illness or Injury  Outcome: Progressing  Intervention: Identify and Manage Fall Risk  Recent Flowsheet Documentation  Taken 7/30/2025 2333 by Susy Mann, RN  Safety Promotion/Fall Prevention:   lighting adjusted   nonskid shoes/slippers when out of bed   room near nurse's station   safety round/check completed  Intervention: Prevent Skin Injury  Recent Flowsheet Documentation  Taken 7/30/2025 2333 by Susy Mann, RN  Body Position: position changed independently  Intervention: Prevent Infection  Recent Flowsheet Documentation  Taken 7/30/2025 2333 by Susy Mann, RN  Infection Prevention:   equipment surfaces disinfected   hand hygiene promoted   personal protective equipment utilized   rest/sleep promoted   single patient room provided  Goal: Optimal Comfort and Wellbeing  Outcome: Progressing  Goal: Readiness for Transition of Care  Outcome: Progressing  Intervention: Mutually Develop Transition Plan  Recent Flowsheet Documentation  Taken 7/30/2025 2346 by Susy Mann, RN  Equipment Currently Used at Home: none     Problem: Stroke, Ischemic (Includes Transient Ischemic Attack)  Goal: Optimal Coping  Outcome: Progressing  Goal: Effective Bowel Elimination  Outcome: Progressing  Goal: Optimal Cerebral Tissue Perfusion  Outcome: Progressing  Goal: Optimal Cognitive Function  Outcome: Progressing  Goal: Improved Communication Skills  Outcome: Progressing  Goal: Optimal Functional Ability  Outcome: Progressing  Intervention: Optimize Functional Ability  Recent Flowsheet Documentation  Taken 7/30/2025 2333 by Susy Mann, RN  Activity Management: up ad " rogelio  Goal: Optimal Nutrition Intake  Outcome: Progressing  Goal: Effective Oxygenation and Ventilation  Outcome: Progressing  Intervention: Optimize Oxygenation and Ventilation  Recent Flowsheet Documentation  Taken 7/30/2025 2333 by Susy Mann, RN  Head of Bed (HOB) Positioning: HOB at 20-30 degrees  Goal: Improved Sensorimotor Function  Outcome: Progressing  Intervention: Optimize Range of Motion, Motor Control and Function  Recent Flowsheet Documentation  Taken 7/30/2025 2333 by Susy Mann, RN  Positioning/Transfer Devices:   pillows   in use  Goal: Safe and Effective Swallow  Outcome: Progressing  Goal: Effective Urinary Elimination  Outcome: Progressing
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
## (undated) DEVICE — ESU GROUND PAD ADULT W/CORD E7507

## (undated) DEVICE — GLOVE PROTEXIS POWDER FREE SMT 7.5  2D72PT75X

## (undated) DEVICE — BAG CLEAR TRASH 1.3M 39X33" P4040C

## (undated) DEVICE — ESU ELEC BLADE 2.75" COATED/INSULATED E1455

## (undated) DEVICE — BLADE KNIFE SURG 15 371115

## (undated) DEVICE — PACK MINOR CUSTOM RIDGES SBA32RMRMA

## (undated) DEVICE — LINEN HALF SHEET 5512

## (undated) DEVICE — PREP CHLORAPREP 26ML TINTED ORANGE  260815

## (undated) DEVICE — DRSG STERI STRIP 1/2X4" R1547

## (undated) DEVICE — GLOVE PROTEXIS POWDER FREE 8.0 ORTHOPEDIC 2D73ET80

## (undated) DEVICE — DRAPE LAP PEDS DISP 29492

## (undated) DEVICE — SU VICRYL 3-0 SH 27" UND J416H

## (undated) DEVICE — LINEN FULL SHEET 5511

## (undated) DEVICE — SUCTION CANISTER MEDIVAC LINER 3000ML W/LID 65651-530

## (undated) DEVICE — DECANTER VIAL 2006S

## (undated) RX ORDER — BUPIVACAINE HYDROCHLORIDE 2.5 MG/ML
INJECTION, SOLUTION EPIDURAL; INFILTRATION; INTRACAUDAL
Status: DISPENSED
Start: 2017-11-02

## (undated) RX ORDER — LIDOCAINE HYDROCHLORIDE AND EPINEPHRINE 10; 10 MG/ML; UG/ML
INJECTION, SOLUTION INFILTRATION; PERINEURAL
Status: DISPENSED
Start: 2017-11-02

## (undated) RX ORDER — FENTANYL CITRATE 50 UG/ML
INJECTION, SOLUTION INTRAMUSCULAR; INTRAVENOUS
Status: DISPENSED
Start: 2023-08-02